# Patient Record
Sex: MALE | Race: WHITE | NOT HISPANIC OR LATINO | Employment: FULL TIME | URBAN - METROPOLITAN AREA
[De-identification: names, ages, dates, MRNs, and addresses within clinical notes are randomized per-mention and may not be internally consistent; named-entity substitution may affect disease eponyms.]

---

## 2023-08-04 ENCOUNTER — OFFICE VISIT (OUTPATIENT)
Dept: INTERNAL MEDICINE | Facility: CLINIC | Age: 53
End: 2023-08-04
Payer: COMMERCIAL

## 2023-08-04 VITALS
TEMPERATURE: 98.9 F | BODY MASS INDEX: 36.31 KG/M2 | HEIGHT: 70 IN | DIASTOLIC BLOOD PRESSURE: 110 MMHG | WEIGHT: 253.6 LBS | OXYGEN SATURATION: 95 % | HEART RATE: 71 BPM | SYSTOLIC BLOOD PRESSURE: 160 MMHG

## 2023-08-04 DIAGNOSIS — I10 ESSENTIAL HYPERTENSION: Primary | ICD-10-CM

## 2023-08-04 PROBLEM — R42 DIZZINESS AND GIDDINESS: Status: ACTIVE | Noted: 2018-01-26

## 2023-08-04 PROBLEM — E66.01 CLASS 3 SEVERE OBESITY WITHOUT SERIOUS COMORBIDITY WITH BODY MASS INDEX (BMI) OF 40.0 TO 44.9 IN ADULT: Status: ACTIVE | Noted: 2021-12-17

## 2023-08-04 PROBLEM — E78.2 MIXED HYPERLIPIDEMIA: Status: ACTIVE | Noted: 2021-12-17

## 2023-08-04 PROBLEM — Z12.11 SCREEN FOR COLON CANCER: Status: ACTIVE | Noted: 2019-01-31

## 2023-08-04 PROBLEM — B35.1 ONYCHOMYCOSIS: Status: ACTIVE | Noted: 2019-02-12

## 2023-08-04 PROBLEM — R68.82 DECREASED SEX DRIVE: Status: ACTIVE | Noted: 2019-01-29

## 2023-08-04 PROBLEM — J01.40 SUBACUTE PANSINUSITIS: Status: ACTIVE | Noted: 2022-11-04

## 2023-08-04 PROBLEM — E66.813 CLASS 3 SEVERE OBESITY WITHOUT SERIOUS COMORBIDITY WITH BODY MASS INDEX (BMI) OF 40.0 TO 44.9 IN ADULT: Status: ACTIVE | Noted: 2021-12-17

## 2023-08-04 PROBLEM — E55.9 VITAMIN D DEFICIENCY: Status: ACTIVE | Noted: 2019-01-29

## 2023-08-04 PROBLEM — N52.9 ED (ERECTILE DYSFUNCTION): Status: ACTIVE | Noted: 2019-02-12

## 2023-08-04 PROBLEM — M99.00 SOMATIC DYSFUNCTION OF HEAD REGION: Status: ACTIVE | Noted: 2018-01-26

## 2023-08-04 RX ORDER — MULTIPLE VITAMINS W/ MINERALS TAB 9MG-400MCG
1 TAB ORAL DAILY
COMMUNITY

## 2023-08-04 RX ORDER — LISINOPRIL 10 MG/1
10 TABLET ORAL DAILY
COMMUNITY
End: 2023-08-04 | Stop reason: SDUPTHER

## 2023-08-04 RX ORDER — LISINOPRIL 10 MG/1
10 TABLET ORAL DAILY
Qty: 30 TABLET | Refills: 6 | Status: SHIPPED | OUTPATIENT
Start: 2023-08-04

## 2023-11-21 ENCOUNTER — OFFICE VISIT (OUTPATIENT)
Dept: INTERNAL MEDICINE | Facility: CLINIC | Age: 53
End: 2023-11-21
Payer: COMMERCIAL

## 2023-11-21 VITALS
TEMPERATURE: 97.8 F | SYSTOLIC BLOOD PRESSURE: 120 MMHG | WEIGHT: 252.2 LBS | DIASTOLIC BLOOD PRESSURE: 90 MMHG | OXYGEN SATURATION: 97 % | BODY MASS INDEX: 36.11 KG/M2 | HEART RATE: 64 BPM | HEIGHT: 70 IN

## 2023-11-21 DIAGNOSIS — Z12.11 SCREEN FOR COLON CANCER: ICD-10-CM

## 2023-11-21 DIAGNOSIS — Z23 NEED FOR VACCINATION: ICD-10-CM

## 2023-11-21 DIAGNOSIS — R20.2 PARESTHESIAS: ICD-10-CM

## 2023-11-21 DIAGNOSIS — F41.9 ANXIETY: ICD-10-CM

## 2023-11-21 DIAGNOSIS — F17.290 CIGAR SMOKER: ICD-10-CM

## 2023-11-21 DIAGNOSIS — Z00.00 ANNUAL PHYSICAL EXAM: Primary | ICD-10-CM

## 2023-11-21 DIAGNOSIS — E78.2 MIXED HYPERLIPIDEMIA: ICD-10-CM

## 2023-11-21 DIAGNOSIS — N52.9 ERECTILE DYSFUNCTION, UNSPECIFIED ERECTILE DYSFUNCTION TYPE: ICD-10-CM

## 2023-11-21 DIAGNOSIS — Z11.59 ENCOUNTER FOR HEPATITIS C SCREENING TEST FOR LOW RISK PATIENT: ICD-10-CM

## 2023-11-21 DIAGNOSIS — I10 ESSENTIAL HYPERTENSION: ICD-10-CM

## 2023-11-21 DIAGNOSIS — Z13.21 ENCOUNTER FOR VITAMIN DEFICIENCY SCREENING: ICD-10-CM

## 2023-11-21 DIAGNOSIS — E66.01 CLASS 3 SEVERE OBESITY DUE TO EXCESS CALORIES WITHOUT SERIOUS COMORBIDITY WITH BODY MASS INDEX (BMI) OF 40.0 TO 44.9 IN ADULT: ICD-10-CM

## 2023-11-21 NOTE — LETTER
[facility]  Vaccine Consent Form    Patient Name:  Jayden Stafford  Patient :  1970     Vaccine(s) Ordered    Pneumococcal Conjugate Vaccine 20-Valent (PCV20)        Screening Checklist  The following questions should be completed prior to vaccination. If you answer “yes” to any question, it does not necessarily mean you should not be vaccinated. It just means we may need to clarify or ask more questions. If a question is unclear, please ask your healthcare provider to explain it.    Yes No   Any fever or moderate to severe illness today (mild illness and/or antibiotic treatment are not contraindications)?     Do you have a history of a serious reaction to any previous vaccinations, such as anaphylaxis, encephalopathy within 7 days, Guillain-Cimarron syndrome within 6 weeks, seizure?     Have you received any live vaccine(s) (e.g MMR, SARI) or any other vaccines in the last month (to ensure duplicate doses aren't given)?     Do you have an anaphylactic allergy to latex (DTaP, DTaP-IPV, Hep A, Hep B, MenB, RV, Td, Tdap), baker’s yeast (Hep B, HPV), polysorbates (RSV, nirsevimab, PCV 20, Rotavirrus, Tdap, Shingrix), or gelatin (SARI, MMR)?     Do you have an anaphylactic allergy to neomycin (Rabies, SARI, MMR, IPV, Hep A), polymyxin B (IPV), or streptomycin (IPV)?      Any cancer, leukemia, AIDS, or other immune system disorder? (SARI, MMR, RV)     Do you have a parent, brother, or sister with an immune system problem (if immune competence of vaccine recipient clinically verified, can proceed)? (MMR, SARI)     Any recent steroid treatments for >2 weeks, chemotherapy, or radiation treatment? (SARI, MMR)     Have you received antibody-containing blood transfusions or IVIG in the past 11 months (recommended interval is dependent on product)? (MMR, SARI)     Have you taken antiviral drugs (acyclovir, famciclovir, valacyclovir for SARI) in the last 24 or 48 hours, respectively?      Are you pregnant or planning to become  pregnant within 1 month? (SARI, MMR, HPV, IPV, MenB, Abrexvy; For Hep B- refer to Engerix-B; For RSV - Abrysvo is indicated for 32-36 weeks of pregnancy from September to January)     For infants, have you ever been told your child has had intussusception or a medical emergency involving obstruction of the intestine (Rotavirus)? If not for an infant, can skip this question.         *Ordering Physician/APC should be consulted if “yes” is checked by the patient or guardian above.      I have received, read, and understand the Vaccine Information Statement (VIS) for each vaccine ordered above.  I have considered my health status as well as the health status of my close contacts.  I have taken the opportunity to discuss my vaccine questions with my health care provider.   I have requested that the ordered vaccine(s) be given to me.  I understand the benefits and risks of the vaccines.  I understand that I should remain in the clinic for 15 minutes after receiving the vaccine(s).  _________________________________________________________  Signature of Patient or Parent/Legal Guardian ____________________  Date

## 2023-11-21 NOTE — PROGRESS NOTES
"Chief Complaint  Annual Exam (Hand numbness)    Subjective        Jayden Stafford presents to [facility]  History of Present Illness  Here for annual exam.    Reports hand numbness:   reports some intermittent hand numbness, only occurs when sleeps on bilateral shoulders. Will go away when moves shoulders around. Not losing  strength.     Hypertension: He was last seen in our office on August 4, 2023 reported taking lisinopril 10 mg p.o. daily least 2 years with about a 3-month hiatus.  At the time, he stated that he was not measuring his blood pressure at home.  His blood pressure was elevated, with a systolic blood pressure in 150s.  Not reporting any chest pain or shortness of air. Still taking lisinopril 10 mg daily and doing.     At last office visit, he did state that he tries to do some form of physical activity per week, including yard work and golf and walking.  At the time, he also states that he was trying to watch his sodium intake and make dietary changes, including incorporating more fresh vegetables to his diet.  He does smoke cigars he plays golf.  He also stated that he drank about 10 alcoholic drinks per week, again associated with playing golf and having dinner.    Erectile dysfunction: he went on vacation and did not have ED. No changes in his urination, but states his wife is concerned.     Anxiety: he has periods of anxiety, life stressors. No history of sucidie or suicidal ideation. Has talked to a behavior health provider in the past, did not have a good rapport. He took Paxil about 20 years ago and did not like it, made him feel 'fuzzy\". Took 10 mg .     Family history of colon cancer (father who passed away from colon cancer).  His mother passed away from natural causes.    Hyperlipidemia: Labs from December 2021 indicated that he did have elevated lipids and he has never been started on statin therapy. Last colonoscopy was 2019 with Dr.Rajesh Bell. " "Recommended next in 3 years, so is over due. No issues with stooling.       Objective   Vital Signs:  /90 (BP Location: Left arm, Patient Position: Sitting, Cuff Size: Large Adult)   Pulse 64   Temp 97.8 °F (36.6 °C) (Infrared)   Ht 177.8 cm (70\")   Wt 114 kg (252 lb 3.2 oz)   SpO2 97%   BMI 36.19 kg/m²   Estimated body mass index is 36.19 kg/m² as calculated from the following:    Height as of this encounter: 177.8 cm (70\").    Weight as of this encounter: 114 kg (252 lb 3.2 oz).               Physical Exam  Vitals reviewed.   Constitutional:       General: He is not in acute distress.     Appearance: Normal appearance. He is obese. He is not ill-appearing, toxic-appearing or diaphoretic.   HENT:      Head: Normocephalic.      Right Ear: Tympanic membrane, ear canal and external ear normal.      Left Ear: Tympanic membrane, ear canal and external ear normal.   Eyes:      Conjunctiva/sclera: Conjunctivae normal.      Pupils: Pupils are equal, round, and reactive to light.   Cardiovascular:      Rate and Rhythm: Normal rate and regular rhythm.      Pulses: Normal pulses.      Heart sounds: Normal heart sounds.   Pulmonary:      Effort: Pulmonary effort is normal.      Breath sounds: Decreased breath sounds (Mild and throughout all lobes) present. No wheezing, rhonchi or rales.   Abdominal:      General: Bowel sounds are normal.      Palpations: Abdomen is soft.      Tenderness: There is no abdominal tenderness.   Musculoskeletal:      Right lower leg: No edema.      Left lower leg: No edema.   Skin:     General: Skin is warm.      Capillary Refill: Capillary refill takes less than 2 seconds.   Neurological:      General: No focal deficit present.      Mental Status: He is alert and oriented to person, place, and time. Mental status is at baseline.      Motor: No weakness.      Coordination: Coordination normal.      Gait: Gait normal.      Deep Tendon Reflexes: Reflexes normal.   Psychiatric:         Mood " and Affect: Mood normal.         Behavior: Behavior normal.         Thought Content: Thought content normal.         Judgment: Judgment normal.        Result Review :                         Assessment and Plan   Patient is a pleasant 53-year-old male with obesity, essential hypertension, mixed hyperlipidemia, BPPV, intermittent erectile dysfunction he states mostly related to anxiety, cigar smoker here for annual exam.     We discussed preventative care including age and patient-appropriate immunizations and cancer screening. We also discussed the importance of exercise and a healthy diet. This is their annual preventative exam.             Diagnoses and all orders for this visit:    1. Annual physical exam (Primary)  -     CBC & Differential  -     Comprehensive Metabolic Panel    2. Essential hypertension  Comments:  Blood pressure well controlled today.  Continue current 10 mg dose lisinopril.  Enourage to monitor at home, record readings.    3. Mixed hyperlipidemia  -     Lipid Panel    4. Class 3 severe obesity due to excess calories without serious comorbidity with body mass index (BMI) of 40.0 to 44.9 in adult  Comments:  Discussed lifestyle modifications, including cutting back on alcohol consumption.  Orders:  -     Thyroid Panel With TSH    5. Screen for colon cancer  Comments:  Last colonoscopy 2019.  Family history of colon cancer (father).  Orders:  -     Ambulatory Referral For Screening Colonoscopy    6. Need for vaccination  -     Pneumococcal Conjugate Vaccine 20-Valent (PCV20)    7. Encounter for hepatitis C screening test for low risk patient  -     HCV Antibody Rfx To Qnt PCR    8. Paresthesias  Comments:  Bilateral hands in the mornings after lying on shoulders.  Suspect due to shoulder pain.  Further treatment\evaluation pending labs.  Orders:  -     Cancel: Vitamin D,25-Hydroxy  -     Magnesium  -     Vitamin B12    9. Encounter for vitamin deficiency screening  -     Vitamin  D,25-Hydroxy    10. Erectile dysfunction, unspecified erectile dysfunction type  -     Ambulatory Referral to Urology    11. Anxiety  -     Ambulatory Referral to Behavioral Health    12. Cigar smoker  Comments:  Encourage patient to completely abstain from any type of tobacco use.             Follow Up   Return in about 1 year (around 11/21/2024) for Annual physical.  Patient was given instructions and counseling regarding his condition or for health maintenance advice. Please see specific information pulled into the AVS if appropriate.

## 2023-11-22 LAB
25(OH)D3+25(OH)D2 SERPL-MCNC: 24.6 NG/ML (ref 30–100)
ALBUMIN SERPL-MCNC: 4.2 G/DL (ref 3.8–4.9)
ALBUMIN/GLOB SERPL: 1.7 {RATIO} (ref 1.2–2.2)
ALP SERPL-CCNC: 75 IU/L (ref 44–121)
ALT SERPL-CCNC: 33 IU/L (ref 0–44)
AST SERPL-CCNC: 26 IU/L (ref 0–40)
BASOPHILS # BLD AUTO: 0.1 X10E3/UL (ref 0–0.2)
BASOPHILS NFR BLD AUTO: 1 %
BILIRUB SERPL-MCNC: 0.6 MG/DL (ref 0–1.2)
BUN SERPL-MCNC: 10 MG/DL (ref 6–24)
BUN/CREAT SERPL: 9 (ref 9–20)
CALCIUM SERPL-MCNC: 9.2 MG/DL (ref 8.7–10.2)
CHLORIDE SERPL-SCNC: 105 MMOL/L (ref 96–106)
CHOLEST SERPL-MCNC: 196 MG/DL (ref 100–199)
CO2 SERPL-SCNC: 23 MMOL/L (ref 20–29)
CREAT SERPL-MCNC: 1.16 MG/DL (ref 0.76–1.27)
EGFRCR SERPLBLD CKD-EPI 2021: 74 ML/MIN/1.73
EOSINOPHIL # BLD AUTO: 0.1 X10E3/UL (ref 0–0.4)
EOSINOPHIL NFR BLD AUTO: 2 %
ERYTHROCYTE [DISTWIDTH] IN BLOOD BY AUTOMATED COUNT: 13.9 % (ref 11.6–15.4)
FT4I SERPL CALC-MCNC: 1.3 (ref 1.2–4.9)
GLOBULIN SER CALC-MCNC: 2.5 G/DL (ref 1.5–4.5)
GLUCOSE SERPL-MCNC: 89 MG/DL (ref 70–99)
HCT VFR BLD AUTO: 44.9 % (ref 37.5–51)
HCV AB SERPL QL IA: NORMAL
HCV IGG SERPL QL IA: NON REACTIVE
HDLC SERPL-MCNC: 30 MG/DL
HGB BLD-MCNC: 15.3 G/DL (ref 13–17.7)
IMM GRANULOCYTES # BLD AUTO: 0 X10E3/UL (ref 0–0.1)
IMM GRANULOCYTES NFR BLD AUTO: 0 %
LDLC SERPL CALC-MCNC: 127 MG/DL (ref 0–99)
LYMPHOCYTES # BLD AUTO: 1.5 X10E3/UL (ref 0.7–3.1)
LYMPHOCYTES NFR BLD AUTO: 18 %
MAGNESIUM SERPL-MCNC: 2.1 MG/DL (ref 1.6–2.3)
MCH RBC QN AUTO: 30.9 PG (ref 26.6–33)
MCHC RBC AUTO-ENTMCNC: 34.1 G/DL (ref 31.5–35.7)
MCV RBC AUTO: 91 FL (ref 79–97)
MONOCYTES # BLD AUTO: 0.7 X10E3/UL (ref 0.1–0.9)
MONOCYTES NFR BLD AUTO: 8 %
NEUTROPHILS # BLD AUTO: 5.9 X10E3/UL (ref 1.4–7)
NEUTROPHILS NFR BLD AUTO: 71 %
PLATELET # BLD AUTO: 284 X10E3/UL (ref 150–450)
POTASSIUM SERPL-SCNC: 4.5 MMOL/L (ref 3.5–5.2)
PROT SERPL-MCNC: 6.7 G/DL (ref 6–8.5)
RBC # BLD AUTO: 4.95 X10E6/UL (ref 4.14–5.8)
SODIUM SERPL-SCNC: 141 MMOL/L (ref 134–144)
T3RU NFR SERPL: 24 % (ref 24–39)
T4 SERPL-MCNC: 5.6 UG/DL (ref 4.5–12)
TRIGL SERPL-MCNC: 215 MG/DL (ref 0–149)
TSH SERPL DL<=0.005 MIU/L-ACNC: 1.2 UIU/ML (ref 0.45–4.5)
VIT B12 SERPL-MCNC: 467 PG/ML (ref 232–1245)
VLDLC SERPL CALC-MCNC: 39 MG/DL (ref 5–40)
WBC # BLD AUTO: 8.3 X10E3/UL (ref 3.4–10.8)

## 2023-12-14 ENCOUNTER — TELEMEDICINE (OUTPATIENT)
Dept: PSYCHIATRY | Facility: CLINIC | Age: 53
End: 2023-12-14
Payer: COMMERCIAL

## 2023-12-14 DIAGNOSIS — F41.1 GAD (GENERALIZED ANXIETY DISORDER): Primary | ICD-10-CM

## 2023-12-14 DIAGNOSIS — F33.0 MDD (MAJOR DEPRESSIVE DISORDER), RECURRENT EPISODE, MILD: ICD-10-CM
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
## 2024-01-05 ENCOUNTER — TELEMEDICINE (OUTPATIENT)
Dept: PSYCHIATRY | Facility: CLINIC | Age: 54
End: 2024-01-05
Payer: COMMERCIAL

## 2024-01-05 DIAGNOSIS — F33.0 MDD (MAJOR DEPRESSIVE DISORDER), RECURRENT EPISODE, MILD: ICD-10-CM

## 2024-01-05 DIAGNOSIS — F41.1 GAD (GENERALIZED ANXIETY DISORDER): Primary | ICD-10-CM

## 2024-01-05 NOTE — PROGRESS NOTES
Date: January 8, 2024  Time In: 9:58  Time out: 10:52      This provider is located at the [facility] (through [facility]), [address], [place] [address] using a secure B[employer]Mod[employer] Video Visit through [employer]. Patient is being seen remotely via telehealth at home address in [place] and stated they are in a secure environment for this session. The patient's condition being diagnosed/treated is appropriate for telemedicine. The provider identified herself as well as her credentials. The patient, and/or patients guardian, consent to be seen remotely, and when consent is given they understand that the consent allows for patient identifiable information to be sent to a third party as needed. They may refuse to be seen remotely at any time. The electronic data is encrypted and password protected, and the patient and/or guardian has been advised of the potential risks to privacy not withstanding such measures.     You have chosen to receive care through a telehealth visit.  Do you consent to use a video/audio connection for your medical care today? Yes    Subjective   Jayden Stafford is a 53 y.o. male who presents today for follow up    Chief Complaint:   Chief Complaint   Patient presents with    Anxiety    Depression    Sleeping Problem     Pt was enc to contact PCP and discuss sleep study options.            History of Present Illness: Rapport was established through conversation and unconditional positive regard. Recent events were discussed and how these events impact Pt's emotional health.   Pt states they have been using coping skills successfully 3 out of 5 times since last report.  Pt reports continuation of symptoms and states the intensity and duration of symptoms has remained unchanged over the past 2 weeks. Pt rates anxiety at 7/10 and depression at 2.     Discussed Pt's sleep habits and encouraged healthy habits. Pt encouraged to discuss sleep study at next  "appointment with medical provider.  Pt reports he has agitation and anxiety about things that has not occurred yet. Pt states he gets very agitated with his family at times for not performing tasks while Pt is away on business.  Pt states he holds comments and requests in until he gets home because he does not like to communicate using technology and he prefers face to face interactions. Pt and Counselor discussed the importance of processing feelings and not holding them in, as this increases Pt's anxiety and causes Pt to ruminate about the situation. Pt gave an example of asking his family to make sure the cars were parked so the trash truck could maneuver to do the . Pt was encouraged to identify maladaptive thoughts and statements and re-frame from a different viewpoint, and this was helpful to Pt.      Clinical Maneuvering/Intervention:  CBT was utilized to encourage Pt to identify maladaptive thoughts and behaviors and replace with more affirming and positive.CBT used to assist Pt with managing anxiety and mood issues through controlled breathing, muscle relaxation, and mindfulness.CBT utilized to encourage Pt to refrain from catastrophizing and adopting all or nothing thinking.  Pt encouraged to re-frame thoughts and self talk and identify 'I can't' statements and replace with 'I will\" statements.        Assisted patient in processing above session content; acknowledged and normalized patient’s thoughts, feelings, and concerns.  Rationalized patient thought process regarding concerns presented at session.  Discussed triggers associated with patient's  anxiety  and depression  Also discussed coping skills for patient to implement such as positive self talk and re-framing.    Allowed patient to freely discuss issues without interruption or judgment. Provided safe, confidential environment to facilitate the development of positive therapeutic relationship and encourage open, honest communication. Assisted " patient in identifying risk factors which would indicate the need for higher level of care including thoughts to harm self or others and/or self-harming behavior and encouraged patient to contact this office, call 911, or present to the nearest emergency room should any of these events occur. Discussed crisis intervention services and means to access. Patient adamantly and convincingly denies current suicidal or homicidal ideation or perceptual disturbance.    Assessment:     Patient appears to maintain relative stability as compared to their baseline.  However, patient continues to struggle with anxiety and depression, which continues to cause impairment in important areas of functioning.  A result, they can be reasonably expected to continue to benefit from treatment and would likely be at increased risk for decompensation otherwise.      PHQ-Score Total:  PHQ-9 Total Score:      PAMELLA-7 Score Total:         Mental Status Exam:   Hygiene:   good  Cooperation:  Cooperative  Eye Contact:  Good  Psychomotor Behavior:  Appropriate  Affect:  Restricted  Mood: anxious and irritable  Speech:  Normal  Thought Process:  Goal directed  Thought Content:  Normal  Suicidal:  None  Homicidal:  None  Hallucinations:  None  Delusion:  None  Memory:  Intact  Orientation:  Grossly intact  Reliability:  good  Insight:  Fair  Judgement:  Good  Impulse Control:  Good  Physical/Medical Issues:  No        Patient's Support Network Includes:  wife    Functional Status: Moderate impairment     Progress toward goal: Not at goal    Prognosis: Good with Ongoing Treatment         Plan:    Patient will continue in individual outpatient therapy with focus on improved functioning and coping skills, maintaining stability, and avoiding decompensation and the need for higher level of care.    Patient will adhere to medication regimen as prescribed and report any side effects. Patient will contact this office, call 911 or present to the nearest  emergency room should suicidal or homicidal ideations occur. Provide Cognitive Behavioral Therapy and Solution Focused Therapy to improve functioning, maintain stability, and avoid decompensation and the need for higher level of care.     Return in about 2 weeks (around 1/19/2024).      VISIT DIAGNOSIS:    Diagnosis Plan   1. PAMELLA (generalized anxiety disorder)        2. MDD (major depressive disorder), recurrent episode, mild         10:11 EST       This document has been electronically signed by REZA Martinez  January 8, 2024      Part of this note may be an electronic transcription/translation of spoken language to printed text using the Dragon Dictation System.

## 2024-01-09 DIAGNOSIS — I10 ESSENTIAL HYPERTENSION: ICD-10-CM

## 2024-01-10 DIAGNOSIS — G47.9 SLEEP DISTURBANCES: Primary | ICD-10-CM

## 2024-01-10 RX ORDER — LISINOPRIL 10 MG/1
10 TABLET ORAL DAILY
Qty: 30 TABLET | Refills: 6 | Status: SHIPPED | OUTPATIENT
Start: 2024-01-10

## 2024-02-02 ENCOUNTER — TELEMEDICINE (OUTPATIENT)
Dept: PSYCHIATRY | Facility: CLINIC | Age: 54
End: 2024-02-02
Payer: COMMERCIAL

## 2024-02-02 DIAGNOSIS — F41.1 GAD (GENERALIZED ANXIETY DISORDER): Primary | ICD-10-CM

## 2024-02-02 DIAGNOSIS — F33.0 MDD (MAJOR DEPRESSIVE DISORDER), RECURRENT EPISODE, MILD: ICD-10-CM

## 2024-02-02 PROCEDURE — 90837 PSYTX W PT 60 MINUTES: CPT | Performed by: COUNSELOR

## 2024-02-02 NOTE — PROGRESS NOTES
Date: February 5, 2024  Time In: 8:30   Time out: 9:39      This provider is located at the [facility] ([facility]), [address], [place] [address] using a secure Compass[employer]art Video Visit through [employer]. Patient is being seen remotely via telehealth at home address in [place] and stated they are in a secure environment for this session. The patient's condition being diagnosed/treated is appropriate for telemedicine. The provider identified herself as well as her credentials. The patient, and/or patients guardian, consent to be seen remotely, and when consent is given they understand that the consent allows for patient identifiable information to be sent to a third party as needed. They may refuse to be seen remotely at any time. The electronic data is encrypted and password protected, and the patient and/or guardian has been advised of the potential risks to privacy not withstanding such measures.     You have chosen to receive care through a telehealth visit.  Do you consent to use a video/audio connection for your medical care today? Yes    Subjective   Jayden Stafford is a 53 y.o. male who presents today for follow up    Chief Complaint:   Chief Complaint   Patient presents with    Anxiety    Depression    Sleeping Problem        History of Present Illness: Rapport was established through conversation and unconditional positive regard. Recent events were discussed and how these events impact Pt's emotional health.   Pt states they have been using coping skills successfully 3 out of 5 times since last report.  Pt reports continuation of symptoms and states the intensity and duration of symptoms has remained unchanged over the past 2 weeks. Pt rates anxiety at 2/10 and depression at 2. Pt reports he continues to have significant sleep issues and he has an appointment with a sleep neurologist.  Pt states he and s/o went on a cruise last week and he had an enjoyable.  Pt states he was very apprehensive about going on the trip and he has heightened anxiety over many aspects of the environment of the trip being out of his control.  Pt states his anxiety was 8/10 just before leaving home and he states anxiety averaged 2/0 and it was very pleasant. Discussed pre-emptive anxiety and how negative thought loops can affect Pt and diminish enjoyment of pleasurable activities. Discussed being mindful and present in the moment, affirmations, and visualizing the anxiety as a cycle and interrupting the momentum using self talk and actions. Healthy sleep habits discussed.            Anxiety 0-10 rate: 1    Depression: 0    Clinical Maneuvering/Intervention:Pt was praised for their report of successful use of learned coping skills and was encouraged to continue practicing calming and mood elevating techniques daily.CBT was utilized to encourage Pt to identify maladaptive thoughts and behaviors and replace with more affirming and positive. CBT used to assist Pt with managing anxiety and mood issues through controlled breathing, muscle relaxation, and mindfulness.Healthy sleep habits were discussed such as maintaining a schedule, routine, avoiding caffeine, and limiting screen time before bed.        Assisted patient in processing above session content; acknowledged and normalized patient’s thoughts, feelings, and concerns.  Rationalized patient thought process regarding concerns presented at session.  Discussed triggers associated with patient's  anxiety  and depression  Also discussed coping skills for patient to implement such as grounding , mindfulness , and positive self talk     Allowed patient to freely discuss issues without interruption or judgment. Provided safe, confidential environment to facilitate the development of positive therapeutic relationship and encourage open, honest communication. Assisted patient in identifying risk factors which would indicate the need for higher level  of care including thoughts to harm self or others and/or self-harming behavior and encouraged patient to contact this office, call 911, or present to the nearest emergency room should any of these events occur. Discussed crisis intervention services and means to access. Patient adamantly and convincingly denies current suicidal or homicidal ideation or perceptual disturbance.    Assessment:     Patient appears to maintain relative stability as compared to their baseline.  However, patient continues to struggle with anxiety and depression which continues to cause impairment in important areas of functioning.  A result, they can be reasonably expected to continue to benefit from treatment and would likely be at increased risk for decompensation otherwise.      PHQ-Score Total:  PHQ-9 Total Score:      PAMELLA-7 Score Total:         Mental Status Exam:   Hygiene:   good  Cooperation:  Cooperative  Eye Contact:  Good  Psychomotor Behavior:  Appropriate  Affect:  Full range  Mood: anxious  Speech:  Normal  Thought Process:  Goal directed  Thought Content:  Normal  Suicidal:  None  Homicidal:  None  Hallucinations:  None  Delusion:  None  Memory:  Intact  Orientation:  Grossly intact  Reliability:  good  Insight:  Good  Judgement:  Good  Impulse Control:  Good  Physical/Medical Issues:  No        Patient's Support Network Includes:  wife    Functional Status: Mild impairment     Progress toward goal: Not at goal    Prognosis: Good with Ongoing Treatment         Plan:    Patient will continue in individual outpatient therapy with focus on improved functioning and coping skills, maintaining stability, and avoiding decompensation and the need for higher level of care.    Patient will adhere to medication regimen as prescribed and report any side effects. Patient will contact this office, call 911 or present to the nearest emergency room should suicidal or homicidal ideations occur. Provide Cognitive Behavioral Therapy and  Solution Focused Therapy to improve functioning, maintain stability, and avoid decompensation and the need for higher level of care.     Return in about 1 week (around 2/9/2024).      VISIT DIAGNOSIS:    Diagnosis Plan   1. PAMELLA (generalized anxiety disorder)        2. MDD (major depressive disorder), recurrent episode, mild         08:35 EST       This document has been electronically signed by REZA Martinez  February 5, 2024      Part of this note may be an electronic transcription/translation of spoken language to printed text using the Dragon Dictation System.

## 2024-02-05 NOTE — PATIENT INSTRUCTIONS
Here is a link to the Patient Health Questionnaire - 9 (PHQ-9).    https://www.apa.org/depression-guideline/patient-health-questionnaire.pdf            Here is the link for the PAMELLA-7 scale    https://adaa.org/sites/default/files/[email or web address]-[email or web address]_[email or web address].pdf

## 2024-02-16 ENCOUNTER — OFFICE VISIT (OUTPATIENT)
Dept: SLEEP MEDICINE | Facility: HOSPITAL | Age: 54
End: 2024-02-16
Payer: COMMERCIAL

## 2024-02-16 VITALS — BODY MASS INDEX: 36.94 KG/M2 | HEART RATE: 75 BPM | WEIGHT: 258 LBS | HEIGHT: 70 IN | OXYGEN SATURATION: 97 %

## 2024-02-16 DIAGNOSIS — E66.9 CLASS 2 OBESITY WITH BODY MASS INDEX (BMI) OF 37.0 TO 37.9 IN ADULT, UNSPECIFIED OBESITY TYPE, UNSPECIFIED WHETHER SERIOUS COMORBIDITY PRESENT: ICD-10-CM

## 2024-02-16 DIAGNOSIS — R29.818 SUSPECTED SLEEP APNEA: Primary | ICD-10-CM

## 2024-02-16 DIAGNOSIS — R06.83 SNORING: ICD-10-CM

## 2024-02-16 PROCEDURE — G0463 HOSPITAL OUTPT CLINIC VISIT: HCPCS

## 2024-03-01 ENCOUNTER — TELEMEDICINE (OUTPATIENT)
Dept: PSYCHIATRY | Facility: CLINIC | Age: 54
End: 2024-03-01
Payer: COMMERCIAL

## 2024-03-01 DIAGNOSIS — F41.1 GAD (GENERALIZED ANXIETY DISORDER): Primary | ICD-10-CM

## 2024-03-01 NOTE — PROGRESS NOTES
March 3, 2024  Time In: 9:07  Time out: 9:41    NOTE:  Pt had to end the call early to attend a work meeting.    This provider is located at the [facility] ([facility]), [address], [place] [address] using a secure [employer] Video Visit through [email or web address]. Patient is being seen remotely via telehealth at home address in [place] and stated they are in a secure environment for this session. The patient's condition being diagnosed/treated is appropriate for telemedicine. The provider identified herself as well as her credentials. The patient, and/or patients guardian, consent to be seen remotely, and when consent is given they understand that the consent allows for patient identifiable information to be sent to a third party as needed. They may refuse to be seen remotely at any time. The electronic data is encrypted and password protected, and the patient and/or guardian has been advised of the potential risks to privacy not withstanding such measures.     You have chosen to receive care through a telehealth visit.  Do you consent to use a video/audio connection for your medical care today? Yes    Subjective   Jayden Stafford is a 53 y.o. male who presents today for follow up    Chief Complaint:   Chief Complaint   Patient presents with    Anxiety    Depression        History of Present Illness: Rapport was established through conversation and unconditional positive regard. Recent events were discussed and how these events impact Pt's emotional health.   Pt reports successful use coping skills successfully 7 out of 10 times since last report.  Pt reports continuation of symptoms and states the intensity and duration of symptoms has improved since last report..     Pt states he had a sleep study and was advised to use a sleep machine for sleep apnea. Pt states he holding off on picking up his sleep machine due to the expense. Pt reports he has accepted a position with  "another company, and Pt feels this will alleviate much of his anxiety, as work issues contributed to Pt's anxiety over the past 6-12 months.  Pt states he no longer will have a company car and he has been feeling mild to moderate situational stress about having to get a reliable vehicle.  Pt was allowed to verbally process some feelings of anticipatory anxiety over his new job and possibly being \"micro-managed\" by his new employer. Pt was able to gain some insight in the processing.  Pt states he feels the new position will afford him better communication and more acknowledgement and he feels this will also help with his stress at home.      Clinical Maneuvering/Intervention:  CBT was utilized to encourage Pt to identify maladaptive thoughts and behaviors and replace with more affirming and positive.CBT used to assist Pt with managing anxiety and mood issues through controlled breathing, muscle relaxation, and mindfulness.Esteem building was enhanced through praise, reassurance, normalizing/challenging, and encouragement as appropriate. Coping skills were enhanced to build distress tolerance skills and emotional regulation.         Assisted patient in processing above session content; acknowledged and normalized patient’s thoughts, feelings, and concerns.  Rationalized patient thought process regarding concerns presented at session.  Discussed triggers associated with patient's  anxiety  Also discussed coping skills for patient to implement such as self care  and positive self talk     Allowed patient to freely discuss issues without interruption or judgment. Provided safe, confidential environment to facilitate the development of positive therapeutic relationship and encourage open, honest communication. Assisted patient in identifying risk factors which would indicate the need for higher level of care including thoughts to harm self or others and/or self-harming behavior and encouraged patient to contact this " office, call 911, or present to the nearest emergency room should any of these events occur. Discussed crisis intervention services and means to access. Patient adamantly and convincingly denies current suicidal or homicidal ideation or perceptual disturbance.    Assessment:     Patient appears to maintain relative stability as compared to their baseline.  However, patient continues to struggle with anxiety which continues to cause impairment in important areas of functioning.  A result, they can be reasonably expected to continue to benefit from treatment and would likely be at increased risk for decompensation otherwise.      PHQ-Score Total:  PHQ-9 Total Score:      PAMELLA-7 Score Total:         Mental Status Exam:   Hygiene:   good  Cooperation:  Cooperative  Eye Contact:  Good  Psychomotor Behavior:  Appropriate  Affect:  Full range  Mood: normal  Speech:  Normal  Thought Process:  Goal directed  Thought Content:  Normal  Suicidal:  None  Homicidal:  None  Hallucinations:  None  Delusion:  None  Memory:  Intact  Orientation:  Grossly intact  Reliability:  good  Insight:  Good  Judgement:  Good  Impulse Control:  Good  Physical/Medical Issues:  No        Patient's Support Network Includes:  significant other    Functional Status: Mild impairment     Progress toward goal: Not at goal    Prognosis: Good with Ongoing Treatment         Plan:    Patient will continue in individual outpatient therapy with focus on improved functioning and coping skills, maintaining stability, and avoiding decompensation and the need for higher level of care.    Patient will adhere to medication regimen as prescribed and report any side effects. Patient will contact this office, call 911 or present to the nearest emergency room should suicidal or homicidal ideations occur. Provide Cognitive Behavioral Therapy and Solution Focused Therapy to improve functioning, maintain stability, and avoid decompensation and the need for higher level of  care.     Return in about 2 weeks (around 3/15/2024).      VISIT DIAGNOSIS:    Diagnosis Plan   1. PAMELLA (generalized anxiety disorder)         09:07 EST       This document has been electronically signed by REZA Martinez  March 3, 2024      Part of this note may be an electronic transcription/translation of spoken language to printed text using the Dragon Dictation System.

## 2024-03-15 ENCOUNTER — TELEMEDICINE (OUTPATIENT)
Dept: PSYCHIATRY | Facility: CLINIC | Age: 54
End: 2024-03-15
Payer: COMMERCIAL

## 2024-03-15 DIAGNOSIS — F41.1 GAD (GENERALIZED ANXIETY DISORDER): Primary | ICD-10-CM

## 2024-03-15 DIAGNOSIS — F33.0 MDD (MAJOR DEPRESSIVE DISORDER), RECURRENT EPISODE, MILD: ICD-10-CM

## 2024-03-15 NOTE — PROGRESS NOTES
"Date: March 18, 2024  Time In: 8:05  Time out: 9:04      This provider is located at the [facility] (through [facility]), [address], [place] [address] using a secure [employer] Video Visit through [employer]. Patient is being seen remotely via telehealth at home address in [place] and stated they are in a secure environment for this session. The patient's condition being diagnosed/treated is appropriate for telemedicine. The provider identified herself as well as her credentials. The patient, and/or patients guardian, consent to be seen remotely, and when consent is given they understand that the consent allows for patient identifiable information to be sent to a third party as needed. They may refuse to be seen remotely at any time. The electronic data is encrypted and password protected, and the patient and/or guardian has been advised of the potential risks to privacy not withstanding such measures.     You have chosen to receive care through a telehealth visit.  Do you consent to use a video/audio connection for your medical care today? Yes    Subjective   Jayden Stafford is a 53 y.o. male who presents today for follow up appointment.  Chief Complaint:   Chief Complaint   Patient presents with    Anxiety        History of Present Illness: Rapport was established through conversation and unconditional positive regard. Recent events were discussed and how these events impact Pt's emotional health.   Pt reports successful use of learned coping skills 7 out of 10 times since last report.  Pt reports continuation of symptoms and states the intensity and duration of symptoms has worsened \"some\" since last report. Pt rates anxiety at 8/10 and depression at 4/10.     Sleep: Pt reports sleep has worsened since last report. Healthy sleep habits were discussed such as maintaining a schedule, routine, avoiding caffeine, and limiting screen time before bed. Pt attributes his sleep " "issues to diagnosed sleep apnea, which is being treated by his PCP.    Appetite:  Pt reports appetite has been good since last report.  Discussed the importance of hydration and eating a healthy diet for overall and mental health.    Daily Functioning:  Since last report, Pt states symptoms are causing Mild difficulty in daily functioning.      Subjective Report:  Pt reports \"things are kind of crazy\" due to Pt being let go of his employment suddenly. Pt states this is upset but not too impactful as Pt has recently taken another position with another company. Pt reports he feels somewhat let down but also this justified his move in employment. Pt states he has been able to make more rational and thoughtful decisions over the past month. Pt reports he continues to struggle with \"letting some things go.\"  Pt reports he has established a routine with the family puppy, and this has helped his anxiety as well. Pt does have some antipatory fear of now the new job will be organized, but Pt states he feels it is WNL of someone starting a new job.  Pt praised for his efforts to minimize emotional responses to stressors.      .  Reviewed coping skills and encouraged Pt to continue to practicing daily when not under distress. Pt was praised for their attempts to decrease symptoms and mitigate activating events.    Medication compliance: Pt reports medications are being taken daily as prescribed. Discussed the importance of compliance with prescriber's directions and Pt was instructed to report questions/concerns, as well as missed doses or discontinuation of medication by Pt.     Clinical Maneuvering/Intervention:  CBT was utilized to encourage Pt to identify maladaptive thoughts and behaviors and replace with more affirming and positive.Pt encouraged to set and maintain appropriate and healthy boundaries with others. Pt was instructed to practice daily using appropriate and specific words to communicate feelings to others  " Encouraged Pt to gain a feeling of empowerment over their situation by being cognizant of the things they are able to manipulate and control, such as choosing an outfit for the day or what they will have for lunch. Motivational interviewing used to encourage Pt to identify strengths which can be utilized in working toward treatment goals. Pt encouraged to practice daily learned skills such as controlled breathing, grounding, and mindfulness. Pt was encouraged to ask for help from support persons to assist them in maintaining stability and alleviate symptoms. Discussed the importance of being one's own mental health advocate and to refrain from seeing the need to ask for help as a weakness. Pt was encouraged to formulate a plan of action to be more proactive in managing stressors and refrain from using reactive and automatic heightened emotional responses.     Solution-focused therapy employed to identify how Pt would like their life to be if they were to make positive changes. Pt encouraged to identify effective coping skills and strengths they can use to continue utilizing those skills. Pt encouraged to discontinue utilizing non-effective coping mechanisms. Pt provided with feedback to highlight achievements and personal and other resources. Encouraged use of SMART goals    Assisted patient in processing above session content; acknowledged and normalized patient’s thoughts, feelings, and concerns.  Rationalized patient thought process regarding concerns presented at session.  Discussed triggers associated with patient's  anxiety  and depression  Also discussed coping skills for patient to implement such as mindfulness , self care , positive self talk , and prioritizing self care.    Allowed patient to freely discuss issues without interruption or judgment. Provided safe, confidential environment to facilitate the development of positive therapeutic relationship and encourage open, honest communication. Assisted  patient in identifying risk factors which would indicate the need for higher level of care including thoughts to harm self or others and/or self-harming behavior and encouraged patient to contact this office, call 911, or present to the nearest emergency room should any of these events occur. Discussed crisis intervention services and means to access. Patient adamantly and convincingly denies current suicidal or homicidal ideation or perceptual disturbance.    Assessment:     Patient appears to maintain relative stability as compared to their baseline.  However, patient persistently struggles with symptoms which continue to cause impairment in important areas of functioning.  As a result, they can be reasonably expected to continue to benefit from treatment and would likely be at increased risk for decompensation otherwise.      PHQ-Score Total:  PHQ-9 Total Score: 9    PAMELLA-7 Score Total:         Mental Status Exam:   Hygiene:   good  Cooperation:  Cooperative  Eye Contact:  Good  Psychomotor Behavior:  Appropriate  Affect:  Full range  Mood: anxious  Speech:  Normal  Thought Process:  Goal directed  Thought Content:  Normal  Suicidal:  None  Homicidal:  None  Hallucinations:  None  Delusion:  None  Memory:  Intact  Orientation:  Grossly intact  Reliability:  good  Insight:  Good  Judgement:  Good  Impulse Control:  Good  Physical/Medical Issues:  No        Functional Status: Mild impairment     Progress toward goal: Not at goal    Prognosis: Good with continued therapeutic intervention        Plan:    Patient will continue in individual outpatient therapy with focus on improved functioning and coping skills, maintaining stability, and avoiding decompensation and the need for higher level of care.    Patient will adhere to medication regimen as prescribed and report any side effects. Patient will contact this office, call 911 or present to the nearest emergency room should suicidal or homicidal ideations occur.  Provide Cognitive Behavioral Therapy and Solution Focused Therapy to improve functioning, maintain stability, and avoid decompensation and the need for higher level of care.     Return in about 2 weeks (around 3/29/2024).      VISIT DIAGNOSIS:    Diagnosis Plan   1. PAMELLA (generalized anxiety disorder)        2. MDD (major depressive disorder), recurrent episode, mild         08:05 EDT       This document has been electronically signed by REZA Martinez  March 18, 2024      Part of this note may be an electronic transcription/translation of spoken language to printed text using the Dragon Dictation System.

## 2024-04-19 ENCOUNTER — TELEMEDICINE (OUTPATIENT)
Dept: PSYCHIATRY | Facility: CLINIC | Age: 54
End: 2024-04-19
Payer: COMMERCIAL

## 2024-04-19 DIAGNOSIS — F41.1 GAD (GENERALIZED ANXIETY DISORDER): Primary | ICD-10-CM

## 2024-04-19 PROCEDURE — 90837 PSYTX W PT 60 MINUTES: CPT | Performed by: COUNSELOR

## 2024-04-19 NOTE — PROGRESS NOTES
Date: April 22, 2024  Time In: 8:08  Time out: 9:02      This provider is located at the [facility] ([facility]), [address], [place] [address] using a secure [employer] Video Visit through [employer]. Patient is being seen remotely via telehealth at home address in [place] and stated they are in a secure environment for this session. The patient's condition being diagnosed/treated is appropriate for telemedicine. The provider identified herself as well as her credentials. The patient, and/or patients guardian, consent to be seen remotely, and when consent is given they understand that the consent allows for patient identifiable information to be sent to a third party as needed. They may refuse to be seen remotely at any time. The electronic data is encrypted and password protected, and the patient and/or guardian has been advised of the potential risks to privacy not withstanding such measures.     You have chosen to receive care through a telehealth visit.  Do you consent to use a video/audio connection for your medical care today? Yes    Subjective   Jayden Stafford is a 53 y.o. male who presents today for follow up appointment.    Chief Complaint:   Chief Complaint   Patient presents with    Anxiety        History of Present Illness: Rapport was established through conversation and unconditional positive regard. Recent events were discussed and how these events impact Pt's emotional health.   Pt reports successful use of learned coping skills 4 out of 10 times since last report.  Pt reports continuation of symptoms and states the intensity and duration of symptoms has remained unchanged since last report. Pt rates anxiety at 7/10 and depression at 5/10.     Sleep: Pt reports sleep has remained unchanged since last report. Healthy sleep habits were discussed such as maintaining a schedule, routine, avoiding caffeine, and limiting screen time before  "bed.    Appetite:  Pt reports appetite has been good since last report.  Discussed the importance of hydration and eating a healthy diet for overall and mental health.    Medication compliance: Pt reports medications are being taken daily as prescribed. Discussed the importance of compliance with prescriber's directions and Pt was instructed to report questions/concerns, as well as missed doses or discontinuation of medication by Pt.     Safety Plan in Place: No Pt denies SI/HI/SIB recent or current    Daily Functioning:  Since last report, Pt states symptoms are causing Mild difficulty in daily functioning.      Subjective Report:  Pt states he has been traveling less with his new job and this is good.  Pt states \"it's been good\" when asked about work anxiety.  Pt states he continues to stress out with the puppy, as Pt is finding it frustrating with others in the home when they do not follow his advice on how to train the dog.  Pt cares for the dog when he is home and wife is at work. Pt states a lot of his current anxiety comes from not feeling heard by s/o and son. Pt reports he has to provide specific instructions for his adult son on taking care of things around the house when pt is traveling for work, and having to repeat these instructions and expectations is causing some irritability and exasperation for Pt. Pt states he is having pre-emptive anxiety about how things will be done and having to deal with this when he gets home at the end of the week.   .  Reviewed coping skills and encouraged Pt to continue to practicing coping skills daily when not under distress. Pt was praised for their attempts to decrease symptoms and mitigate activating events.    Clinical Maneuvering/Intervention:  CBT was utilized to encourage Pt to identify maladaptive thoughts and behaviors and replace with more affirming and positive.Pt encouraged to set and maintain appropriate and healthy boundaries with others. Pt was instructed " to practice daily using appropriate and specific words to communicate feelings to others  Encouraged Pt to gain a feeling of empowerment over their situation by being cognizant of the things they are able to manipulate and control, such as choosing an outfit for the day or what they will have for lunch. Motivational interviewing used to encourage Pt to identify strengths which can be utilized in working toward treatment goals. Pt encouraged to practice daily learned skills such as controlled breathing, grounding, and mindfulness. Pt was encouraged to ask for help from support persons to assist them in maintaining stability and alleviate symptoms. Discussed the importance of being one's own mental health advocate and to refrain from seeing the need to ask for help as a weakness. Pt was encouraged to formulate a plan of action to be more proactive in managing stressors and refrain from using reactive and automatic heightened emotional responses.     Solution-focused therapy employed to identify how Pt would like their life to be if they were to make positive changes. Pt encouraged to identify effective coping skills and strengths they can use to continue utilizing those skills. Pt encouraged to discontinue utilizing non-effective coping mechanisms. Pt provided with feedback to highlight achievements and personal and other resources. Encouraged use of SMART goals    Assisted patient in processing above session content; acknowledged and normalized patient’s thoughts, feelings, and concerns.  Rationalized patient thought process regarding concerns presented at session.  Discussed triggers associated with patient's  anxiety  and obsessive thoughts Also discussed coping skills for patient to implement such as self care  and setting boundaries, and effectively communicating wants and needs to others.    Allowed patient to freely discuss issues without interruption or judgment. Provided safe, confidential environment to  facilitate the development of positive therapeutic relationship and encourage open, honest communication. Assisted patient in identifying risk factors which would indicate the need for higher level of care including thoughts to harm self or others and/or self-harming behavior and encouraged patient to contact this office, call 911, or present to the nearest emergency room should any of these events occur. Discussed crisis intervention services and means to access. Patient adamantly and convincingly denies current suicidal or homicidal ideation or perceptual disturbance.    Assessment:     Patient appears to maintain relative stability as compared to their baseline.  However, patient persistently struggles with symptoms which continue to cause impairment in important areas of functioning.  As a result, they can be reasonably expected to continue to benefit from treatment and would likely be at increased risk for decompensation otherwise.      PHQ-Score Total:  PHQ-9 Total Score: 11    PAMELLA-7 Score Total:         Mental Status Exam:   Hygiene:   good  Cooperation:  Cooperative  Eye Contact:  Good  Psychomotor Behavior:  Appropriate  Affect:  Full range  Mood: anxious  Speech:  Normal  Thought Process:  Goal directed  Thought Content:  Normal  Suicidal:  None  Homicidal:  None  Hallucinations:  None  Delusion:  None  Memory:  Intact  Orientation:  Grossly intact  Reliability:  good  Insight:  Good  Judgement:  Good  Impulse Control:  Good  Physical/Medical Issues:  No        Functional Status: Mild impairment     Progress toward goal: Not at goal    Prognosis: Good with continued therapeutic intervention        Plan:    Patient will continue in individual outpatient therapy with focus on improved functioning and coping skills, maintaining stability, and avoiding decompensation and the need for higher level of care.    Patient will adhere to medication regimen as prescribed and report any side effects. Patient will  contact this office, call 911 or present to the nearest emergency room should suicidal or homicidal ideations occur. Provide Cognitive Behavioral Therapy and Solution Focused Therapy to improve functioning, maintain stability, and avoid decompensation and the need for higher level of care.     Return in about 2 weeks (around 5/3/2024).      VISIT DIAGNOSIS:    Diagnosis Plan   1. PAMELLA (generalized anxiety disorder)         08:07 EDT       This document has been electronically signed by REZA Martinez  April 22, 2024      Part of this note may be an electronic transcription/translation of spoken language to printed text using the Dragon Dictation System.

## 2024-05-10 ENCOUNTER — TELEMEDICINE (OUTPATIENT)
Dept: PSYCHIATRY | Facility: CLINIC | Age: 54
End: 2024-05-10
Payer: COMMERCIAL

## 2024-05-10 DIAGNOSIS — F33.0 MDD (MAJOR DEPRESSIVE DISORDER), RECURRENT EPISODE, MILD: ICD-10-CM

## 2024-05-10 DIAGNOSIS — F41.1 GAD (GENERALIZED ANXIETY DISORDER): Primary | ICD-10-CM

## 2024-06-14 ENCOUNTER — TELEMEDICINE (OUTPATIENT)
Dept: PSYCHIATRY | Facility: CLINIC | Age: 54
End: 2024-06-14
Payer: COMMERCIAL

## 2024-06-14 DIAGNOSIS — F41.1 GAD (GENERALIZED ANXIETY DISORDER): Primary | ICD-10-CM

## 2024-06-14 NOTE — PROGRESS NOTES
Date: June 14, 2024  Time In: 8:04  Time out: 9:03      This provider is located at the [facility]through [facility]), [address], [place] [address] using a secure [employer] Video Visit through [employer]. Patient is being seen remotely via telehealth at home address in [place] and stated they are in a secure environment for this session. The patient's condition being diagnosed/treated is appropriate for telemedicine. The provider identified herself as well as her credentials. The patient, and/or patients guardian, consent to be seen remotely, and when consent is given they understand that the consent allows for patient identifiable information to be sent to a third party as needed. They may refuse to be seen remotely at any time. The electronic data is encrypted and password protected, and the patient and/or guardian has been advised of the potential risks to privacy not withstanding such measures.     You have chosen to receive care through a telehealth visit.  Do you consent to use a video/audio connection for your medical care today? Yes    Subjective   Jayden Stafford is a 53 y.o. male who presents today fully oriented, appropriately dressed and groomed, and open to communication for follow up appointment.    Chief Complaint:   Chief Complaint   Patient presents with    Anxiety    Sleeping Problem        History of Present Illness: Rapport was established through conversation and unconditional positive regard. Recent events were discussed and how these events impact Pt's emotional health.   Pt reports successful use of learned coping skills 4 out of 10 times since last report.  Pt reports continuation of symptoms and states the intensity and duration of symptoms has worsened since last report. Pt rates anxiety at 10/10 and depression at 0/10.     Sleep: Pt reports sleep has remained unchanged since last report. Healthy sleep habits were discussed such as maintaining  a schedule, routine, avoiding caffeine, and limiting screen time before bed.  Sleep continues to be poor and Pt states his travel and anxiety lends to delay in onset and some non-restorative sleep.  Wakes early and cannot go back to sleep.     Appetite:  Pt reports appetite has been good since last report.  Discussed the importance of hydration and eating a healthy diet for overall and mental health.    Medication compliance: Pt reports medications are being taken daily as prescribed. Discussed the importance of compliance with prescriber's directions and Pt was instructed to report questions/concerns, as well as missed doses or discontinuation of medication by Pt.     Safety Plan in Place: No Pt denies SI/HI/SIB recent or current    Daily Functioning:  Since last report, Pt states symptoms are causing Mild difficulty in daily functioning.      Subjective Report:  Pt states he has been very agitated with his wife's actions and statements lately.  Pt reports he just dropped wife off at airport for her cruise with her sisters.  Pt states he is often triggered by s/o's inconsiderate actions and remarks. Pt    States he gets frustrated because he is away most of the week and when he comes home he would like to be in a more inviting and relaxing environment. States they have had couples counseling with their [employer] a few years back and it was not helpful. Pt reports he does verbalize his concerns, and admits it is not always in a positive manner, especially when he has to do this repeatedly.  Pt states he gets tired of saying the same thing in different ways with no positive result.  Discussed how Pt can vent his emotions and refrain from allowing it to build. Pt was allowed to openly verbally process his feelings and emotions and this was helpful for Pt.  Reviewed calming skills and boundary setting.  .  Reviewed coping skills and encouraged Pt to continue to practicing coping skills daily when not under distress. Pt  was praised for their attempts to decrease symptoms and mitigate activating events.    Clinical Maneuvering/Intervention:  CBT was utilized to encourage Pt to identify maladaptive thoughts and behaviors and replace with more affirming and positive.Pt encouraged to set and maintain appropriate and healthy boundaries with others. Pt was instructed to practice daily using appropriate and specific words to communicate feelings to others.  Motivational interviewing used to encourage Pt to identify strengths which can be utilized in working toward treatment goals. Pt encouraged to practice daily learned skills such as controlled breathing, grounding, and mindfulness. Pt was encouraged to ask for help from support persons to assist them in maintaining stability and alleviate symptoms. Discussed the importance of being one's own mental health advocate and to refrain from seeing the need to ask for help as a weakness. Pt was encouraged to formulate a plan of action to be more proactive in managing stressors and refrain from using reactive and automatic heightened emotional responses.     Solution-focused therapy employed to identify how Pt would like their life to be if they were to make positive changes. Pt encouraged to identify effective coping skills and strengths they can use to continue utilizing those skills. Pt encouraged to discontinue utilizing non-effective coping mechanisms. Pt provided with feedback to highlight achievements and personal and other resources. Encouraged use of SMART goals    Assisted patient in processing above session content; acknowledged and normalized patient’s thoughts, feelings, and concerns.  Rationalized patient thought process regarding concerns presented at session.  Discussed triggers associated with patient's  anxiety  Also discussed coping skills for patient to implement such as self care , positive self talk , and effectively communicating feelings and emotions to others.      Allowed patient to freely discuss issues without interruption or judgment. Provided safe, confidential environment to facilitate the development of positive therapeutic relationship and encourage open, honest communication. Assisted patient in identifying risk factors which would indicate the need for higher level of care including thoughts to harm self or others and/or self-harming behavior and encouraged patient to contact this office, call 911, or present to the nearest emergency room should any of these events occur. Discussed crisis intervention services and means to access. Patient adamantly and convincingly denies current suicidal or homicidal ideation or perceptual disturbance.    Assessment:     Patient appears to maintain relative stability as compared to their baseline.  However, patient persistently struggles with symptoms which continue to cause impairment in important areas of functioning.  As a result, they can be reasonably expected to continue to benefit from treatment and would likely be at increased risk for decompensation otherwise.      PHQ-Score Total:  PHQ-9 Total Score: 1    PAMELLA-7 Score Total:         Mental Status Exam:   Hygiene:   good  Cooperation:  Cooperative  Eye Contact:  Good  Psychomotor Behavior:  Appropriate  Affect:  Full range  Mood: anxious and irritable  Speech:  Normal  Thought Process:  Goal directed  Thought Content:  Normal  Suicidal:  None  Homicidal: None  Hallucinations:  None  Delusion: None  Memory:  Intact  Orientation:  Grossly Intact  Reliability:  good  Insight:  Good  Judgement:  Good  Impulse Control:  Good  Physical/Medical Issues:  No        Functional Status: Moderate impairment     Progress toward goal: Not at goal    Prognosis: Good with continued therapeutic intervention        Plan:    Patient will continue in individual outpatient therapy with focus on improved functioning and coping skills, maintaining stability, and avoiding decompensation and the  need for higher level of care.    Patient will adhere to medication regimen as prescribed and report any side effects. Patient will contact this office, call 911 or present to the nearest emergency room should suicidal or homicidal ideations occur. Provide Cognitive Behavioral Therapy and Solution Focused Therapy to improve functioning, maintain stability, and avoid decompensation and the need for higher level of care.     Return in about 2 weeks (around 6/28/2024).      VISIT DIAGNOSIS:    Diagnosis Plan   1. PAMELLA (generalized anxiety disorder)         08:04 EDT       This document has been electronically signed by REZA Martinez  June 14, 2024      Part of this note may be an electronic transcription/translation of spoken language to printed text using the Dragon Dictation System.

## 2024-08-02 ENCOUNTER — TELEMEDICINE (OUTPATIENT)
Dept: PSYCHIATRY | Facility: CLINIC | Age: 54
End: 2024-08-02
Payer: COMMERCIAL

## 2024-08-02 DIAGNOSIS — F41.1 GAD (GENERALIZED ANXIETY DISORDER): Primary | ICD-10-CM

## 2024-08-02 DIAGNOSIS — F33.0 MDD (MAJOR DEPRESSIVE DISORDER), RECURRENT EPISODE, MILD: ICD-10-CM

## 2024-08-02 NOTE — PROGRESS NOTES
Date: August 2, 2024  Time In: 8:01  Time out: 9:03      This provider is located at the [facility] (through [facility]), [address], [place] [address] using a secure [employer] Video Visit through [employer]. Patient is being seen remotely via telehealth at home address in [place] and stated they are in a secure environment for this session. The patient's condition being diagnosed/treated is appropriate for telemedicine. The provider identified herself as well as her credentials. The patient, and/or patients guardian, consent to be seen remotely, and when consent is given they understand that the consent allows for patient identifiable information to be sent to a third party as needed. They may refuse to be seen remotely at any time. The electronic data is encrypted and password protected, and the patient and/or guardian has been advised of the potential risks to privacy not withstanding such measures.     You have chosen to receive care through a telehealth visit.  Do you consent to use a video/audio connection for your medical care today? Yes    Subjective   Jayden Stafford is a 53 y.o. male who presents today fully oriented, appropriately dressed and groomed, and open to communication for follow up appointment.    Chief Complaint: No chief complaint on file.       History of Present Illness: Rapport was established through conversation and unconditional positive regard. Recent events were discussed and how these events impact Pt's emotional health.   Pt reports successful use of learned coping skills 7 out of 10 times since last report.  Pt reports continuation of symptoms and states the intensity and duration of symptoms has worsened since last report. Pt rates anxiety at 7/10 and depression at 3/10.     Sleep: Pt reports sleep has remained unchanged somewhat since last report. Healthy sleep habits were discussed such as maintaining a schedule, routine, avoiding  caffeine, and limiting screen time before bed. Pt reports continued difficulty with sleep. States he has d/c drinking beer and he hoped this would help with sleep. Pt states he continues to struggle with insomnia nightly.     Appetite:  Pt reports appetite has been fair since last report.  Discussed the importance of hydration and eating a healthy diet for overall and mental health.  Eating a little bit more and pt attributes this to increase in anxiety.      Medication compliance: Pt reports medications are being taken daily as prescribed. Discussed the importance of compliance with prescriber's directions and Pt was instructed to report questions/concerns, as well as missed doses or discontinuation of medication by Pt.     Safety Plan in Place: No Pt denies SI/HI/SIB recent or current    Daily Functioning:  Since last report, Pt states symptoms are causing Moderate difficulty in daily functioning.      Subjective Report:  Pt states two weekends ago Pt had done a lot of yard work and he had a little too much beer that day. Pt came into the kitchen and he passed out in the kitchen and hit his head.  Son came in and found Pt on the floor.  Went by ambulance to the hospital and Pt was negative for concussion or injury.  Pt received hydration. States he has not had anything to drink since then. Reports d/c alcohol has not improved his sleep.  States he has some guilt and he is upset that he had to subject his son to the situation.  Pt was allowed to verbally process feelings associated with his event, as well as some disappointment in his extended family.  Through dialogue, Pt was able to gain some insight and this was helpful.  Pt's feelings were normalized and validated. Pt agrees he needs to proceed with getting the sleep study recommended by his Dr and Pt will contact provider to schedule.     .  Reviewed coping skills and encouraged Pt to continue to practicing coping skills daily when not under distress. Pt was  praised for their attempts to decrease symptoms and mitigate activating events.    Clinical Maneuvering/Intervention:  CBT was utilized to encourage Pt to identify maladaptive thoughts and behaviors and replace with more affirming and positive.Pt encouraged to set and maintain appropriate and healthy boundaries with others. Pt was instructed to practice daily using appropriate and specific words to communicate feelings to others.  Motivational interviewing used to encourage Pt to identify strengths which can be utilized in working toward treatment goals. Pt encouraged to practice daily learned skills such as controlled breathing, grounding, and mindfulness. Pt was encouraged to ask for help from support persons to assist them in maintaining stability and alleviate symptoms. Discussed the importance of being one's own mental health advocate and to refrain from seeing the need to ask for help as a weakness. Pt was encouraged to formulate a plan of action to be more proactive in managing stressors and refrain from using reactive and automatic heightened emotional responses.     Solution-focused therapy employed to identify how Pt would like their life to be if they were to make positive changes. Pt encouraged to identify effective coping skills and strengths they can use to continue utilizing those skills. Pt encouraged to discontinue utilizing non-effective coping mechanisms. Pt provided with feedback to highlight achievements and personal and other resources. Encouraged use of SMART goals    Assisted patient in processing above session content; acknowledged and normalized patient’s thoughts, feelings, and concerns.  Rationalized patient thought process regarding concerns presented at session.  Discussed triggers associated with patient's  anxiety  and insomnia  Also discussed coping skills for patient to implement such as self care  and positive self talk     Allowed patient to freely discuss issues without  interruption or judgment. Provided safe, confidential environment to facilitate the development of positive therapeutic relationship and encourage open, honest communication. Assisted patient in identifying risk factors which would indicate the need for higher level of care including thoughts to harm self or others and/or self-harming behavior and encouraged patient to contact this office, call 911, or present to the nearest emergency room should any of these events occur. Discussed crisis intervention services and means to access. Patient adamantly and convincingly denies current suicidal or homicidal ideation or perceptual disturbance.    Assessment:     Patient appears to maintain relative stability as compared to their baseline.  However, patient persistently struggles with symptoms which continue to cause impairment in important areas of functioning.  As a result, they can be reasonably expected to continue to benefit from treatment and would likely be at increased risk for decompensation otherwise.      PHQ-Score Total:  PHQ-9 Total Score: 1    PAMELLA-7 Score Total:         Mental Status Exam:   Hygiene:   good  Cooperation:  Cooperative  Eye Contact:  Good  Psychomotor Behavior:  Appropriate  Affect:  Full range  Mood: anxious  Speech:  Normal  Thought Process:  Goal directed  Thought Content:  Normal  Suicidal:  None  Homicidal: None  Hallucinations:  None  Delusion: None  Memory:  Intact  Orientation:  Grossly Intact  Reliability:  good  Insight:  Good  Judgement:  Good  Impulse Control:  Good  Physical/Medical Issues:  No        Functional Status: Moderate impairment     Progress toward goal: Not at goal    Prognosis: Good with continued therapeutic intervention        Plan:    Patient will continue in individual outpatient therapy with focus on improved functioning and coping skills, maintaining stability, and avoiding decompensation and the need for higher level of care.    Patient will adhere to medication  regimen as prescribed and report any side effects. Patient will contact this office, call 911 or present to the nearest emergency room should suicidal or homicidal ideations occur. Provide Cognitive Behavioral Therapy and Solution Focused Therapy to improve functioning, maintain stability, and avoid decompensation and the need for higher level of care.     Return in about 2 weeks (around 8/16/2024).      VISIT DIAGNOSIS:    Diagnosis Plan   1. PAMELLA (generalized anxiety disorder)        2. MDD (major depressive disorder), recurrent episode, mild         08:01 EDT       This document has been electronically signed by REZA Martinez  August 2, 2024      Part of this note may be an electronic transcription/translation of spoken language to printed text using the Dragon Dictation System.

## 2024-09-13 ENCOUNTER — TELEMEDICINE (OUTPATIENT)
Dept: PSYCHIATRY | Facility: CLINIC | Age: 54
End: 2024-09-13
Payer: COMMERCIAL

## 2024-09-13 DIAGNOSIS — F33.0 MDD (MAJOR DEPRESSIVE DISORDER), RECURRENT EPISODE, MILD: ICD-10-CM

## 2024-09-13 DIAGNOSIS — F41.1 GAD (GENERALIZED ANXIETY DISORDER): Primary | ICD-10-CM

## 2024-09-13 NOTE — PROGRESS NOTES
Date: September 13, 2024  Time In: 8:01  Time out: 9:02      This provider is located at the [facility]through [facility]), [address], [place] [address] using a secure [employer] Video Visit through [employer]. Patient is being seen remotely via telehealth at home address in [place] and stated they are in a secure environment for this session. The patient's condition being diagnosed/treated is appropriate for telemedicine. The provider identified herself as well as her credentials. The patient, and/or patients guardian, consent to be seen remotely, and when consent is given they understand that the consent allows for patient identifiable information to be sent to a third party as needed. They may refuse to be seen remotely at any time. The electronic data is encrypted and password protected, and the patient and/or guardian has been advised of the potential risks to privacy not withstanding such measures.     You have chosen to receive care through a telehealth visit.  Do you consent to use a video/audio connection for your medical care today? Yes    Subjective   Jayden Stafford is a 54 y.o. male who presents today fully oriented, appropriately dressed and groomed, and open to communication for follow up appointment.    Chief Complaint:   Chief Complaint   Patient presents with    Anxiety    Sleeping Problem        History of Present Illness: Rapport was established through conversation and unconditional positive regard. Recent events were discussed and how these events impact Pt's emotional health.   Pt reports successful use of learned coping skills 7 out of 10 times since last report.  Pt reports continuation of symptoms and states the intensity and duration of symptoms has improved since last report. Pt rates anxiety at 1/10 and depression at 1/10.     Sleep: Pt reports sleep has remained unchanged since last report. Healthy sleep habits were discussed such as  maintaining a schedule, routine, avoiding caffeine, and limiting screen time before bed.  Sleep continues to be disturbed and often non-restorative.  States he plans to reach out to PCP about getting sleep study done.     Appetite:  Pt reports appetite has been good since last report.  Discussed the importance of hydration and eating a healthy diet for overall and mental health.    Medication compliance: Pt reports medications are being taken daily as prescribed. Discussed the importance of compliance with prescriber's directions and Pt was instructed to report questions/concerns, as well as missed doses or discontinuation of medication by Pt.     Safety Plan in Place: No Pt denies SI/HI/SIB recent or current    Daily Functioning:  Since last report, Pt states symptoms are causing Mild difficulty in daily functioning.      Content Discussion:   Pt reports life updates since previous session. Pt identified current stressors. Pt acknowledged stressors within and outside of one's control. Pt identified successes and issues with utilizing coping mechanisms.  Pt processed some frustration over having to travel for work and encountering airport delays. Pt states he has decreased his drinking beer since an episode a few weeks ago of passing out after mowing and having beer. Pt states he was concerned enough that he decided to be cognizant of how much he is consuming.  Pt reports he has a couple of beers watching sports or socializing.    Counselor supported Pt in continued exploration of underlying belief systems which contribute to difficulty in connecting/vulnerability.  Through discussion, Pt identifies themes of preservation and overt emotional and physical reactivity when physical and/or emotional safety is in question, even in low or perceived threatening situations. Counselor supported Pt in identifying past experiences and underlying beliefs which contribute to these feelings and reactions.  Pt was supported and  encouraged in processing emotions related to frustrations with the expectations of self and others.  Pt was encouraged to identify cultural and nuclear familial contexts which contribute to these concerns.  Pt was receptive and engaged in conversation, and Pt reports this was beneficial and applicable to their situation.      Reviewed coping skills and encouraged Pt to continue to practicing coping skills daily when not under distress. Pt was praised for their attempts to decrease symptoms and mitigate activating events.    Clinical Maneuvering/Intervention:  CBT was utilized to encourage Pt to identify maladaptive thoughts and behaviors and replace with more affirming and positive.Pt encouraged to set and maintain appropriate and healthy boundaries with others. Pt was instructed to practice daily using appropriate and specific words to communicate feelings to others.  Motivational interviewing used to encourage Pt to identify strengths which can be utilized in working toward treatment goals. Pt encouraged to practice daily learned skills such as controlled breathing, grounding, and mindfulness. Pt was encouraged to ask for help from support persons to assist them in maintaining stability and alleviate symptoms. Discussed the importance of being one's own mental health advocate and to refrain from seeing the need to ask for help as a weakness. Pt was encouraged to formulate a plan of action to be more proactive in managing stressors and refrain from using reactive and automatic heightened emotional responses.     Solution-focused therapy employed to identify how Pt would like their life to be if they were to make positive changes. Pt encouraged to identify effective coping skills and strengths they can use to continue utilizing those skills. Pt encouraged to discontinue utilizing non-effective coping mechanisms. Pt provided with feedback to highlight achievements and personal and other resources. Encouraged use of  SMART goals    Assisted patient in processing above session content; acknowledged and normalized patient’s thoughts, feelings, and concerns.  Rationalized patient thought process regarding concerns presented at session.  Discussed triggers associated with patient's  anxiety  and depression  Also discussed coping skills for patient to implement such as mindfulness , self care , and positive self talk     Allowed patient to freely discuss issues without interruption or judgment. Provided safe, confidential environment to facilitate the development of positive therapeutic relationship and encourage open, honest communication. Assisted patient in identifying risk factors which would indicate the need for higher level of care including thoughts to harm self or others and/or self-harming behavior and encouraged patient to contact this office, call 911, or present to the nearest emergency room should any of these events occur. Discussed crisis intervention services and means to access. Patient adamantly and convincingly denies current suicidal or homicidal ideation or perceptual disturbance.    Assessment:     Patient appears to maintain relative stability as compared to their baseline.  However, patient persistently struggles with symptoms which continue to cause impairment in important areas of functioning.  As a result, they can be reasonably expected to continue to benefit from treatment and would likely be at increased risk for decompensation otherwise.      PHQ-Score Total:  PHQ-9 Total Score: 8    PAMELLA-7 Score Total:         Mental Status Exam:   Hygiene:   fair  Cooperation:  Cooperative  Eye Contact:  Good  Psychomotor Behavior:  Appropriate  Affect:  Full range  Mood: normal  Speech:  Normal  Thought Process:  Goal directed  Thought Content:  Normal  Suicidal:  None  Homicidal: None  Hallucinations:  None  Delusion: None  Memory:  Intact  Orientation:  Grossly Intact  Reliability:  good  Insight:  Good  Judgement:   Good  Impulse Control:  Good  Physical/Medical Issues:  No        Functional Status: Mild impairment     Progress toward goal: Not at goal    Prognosis: Good with continued therapeutic intervention        Plan:    Patient will continue in individual outpatient therapy with focus on improved functioning and coping skills, maintaining stability, and avoiding decompensation and the need for higher level of care.    Patient will adhere to medication regimen as prescribed and report any side effects. Patient will contact this office, call 911 or present to the nearest emergency room should suicidal or homicidal ideations occur. Provide Cognitive Behavioral Therapy and Solution Focused Therapy to improve functioning, maintain stability, and avoid decompensation and the need for higher level of care.     Return in about 2 weeks (around 9/27/2024).      VISIT DIAGNOSIS:    Diagnosis Plan   1. PAMELLA (generalized anxiety disorder)        2. MDD (major depressive disorder), recurrent episode, mild         08:02 EDT       This document has been electronically signed by REZA Martinez  September 13, 2024      Part of this note may be an electronic transcription/translation of spoken language to printed text using the Dragon Dictation System.

## 2024-09-27 ENCOUNTER — TELEMEDICINE (OUTPATIENT)
Dept: PSYCHIATRY | Facility: CLINIC | Age: 54
End: 2024-09-27
Payer: COMMERCIAL

## 2024-09-27 DIAGNOSIS — F41.1 GAD (GENERALIZED ANXIETY DISORDER): Primary | ICD-10-CM

## 2024-10-25 ENCOUNTER — TELEMEDICINE (OUTPATIENT)
Dept: PSYCHIATRY | Facility: CLINIC | Age: 54
End: 2024-10-25
Payer: COMMERCIAL

## 2024-10-25 DIAGNOSIS — F33.0 MDD (MAJOR DEPRESSIVE DISORDER), RECURRENT EPISODE, MILD: ICD-10-CM

## 2024-10-25 DIAGNOSIS — F41.1 GAD (GENERALIZED ANXIETY DISORDER): Primary | ICD-10-CM

## 2024-10-25 NOTE — PROGRESS NOTES
Date: October 26, 2024  Time In: 9:07  Time out: 10:08      This provider is located at the [facility]), [address], [place] [address] using a secure [employer] Video Visit through [employer]. Patient is being seen remotely via telehealth at home address in [place] and stated they are in a secure environment for this session. The patient's condition being diagnosed/treated is appropriate for telemedicine. The provider identified herself as well as her credentials. The patient, and/or patients guardian, consent to be seen remotely, and when consent is given they understand that the consent allows for patient identifiable information to be sent to a third party as needed. They may refuse to be seen remotely at any time. The electronic data is encrypted and password protected, and the patient and/or guardian has been advised of the potential risks to privacy not withstanding such measures.     You have chosen to receive care through a telehealth visit.  Do you consent to use a video/audio connection for your medical care today? Yes    Subjective   Jayden Stafford is a 54 y.o. male who presents today fully oriented, appropriately dressed and groomed, and open to communication for follow up appointment.    Chief Complaint:   Chief Complaint   Patient presents with    Anxiety    Depression        History of Present Illness: Rapport was established through conversation and unconditional positive regard. Recent events were discussed and how these events impact Pt's emotional health.   Pt reports successful use of learned coping skills 6 out of 10 times since last report.  Pt reports continuation of symptoms and states the intensity and duration of symptoms has remained unchanged since last report. Pt rates anxiety at 4/10 and depression at 0/10.     Sleep: Pt reports sleep has remained unchanged since last report. Healthy sleep habits were discussed such as  "maintaining a schedule, routine, avoiding caffeine, and limiting screen time before bed.    Appetite:  Pt reports appetite has been good since last report.  Discussed the importance of hydration and eating a healthy diet for overall and mental health.    Medication compliance: Pt reports medications are being taken daily as prescribed. Discussed the importance of compliance with prescriber's directions and Pt was instructed to report questions/concerns, as well as missed doses or discontinuation of medication by Pt.     Safety Plan in Place: No Pt denies SI/HI/SIB recent or current    Daily Functioning:  Since last report, Pt states symptoms are causing Moderate difficulty in daily functioning.      Content Discussion:   Pt reports life updates since previous session. Pt identified current stressors. Pt acknowledged stressors within and outside of one's control. Pt identified successes and issues with utilizing coping mechanisms.  Pt states he just got back from [place] after a [employer] work meeting.  Reports she was anxious leading up to it but it went well.  Reports he is still \"winding down from it.\"   States his flight last night was \"terrible\" due to unruly passengers. Pt reports he is doing better at letting go of agitation and is doing better about bringing work stress home. Reports his youngest son is home this weekend and they have plans for the weekend.  Processed some feelings about transgender son.     Counselor supported Pt in continued exploration of underlying belief systems which contribute to difficulty in connecting/vulnerability.  Through discussion, Pt identifies themes of preservation and overt emotional and physical reactivity when physical and/or emotional statis is in question, even in low or perceived threatening situations. Counselor supported Pt in identifying past experiences and underlying beliefs which contribute to these feelings and reactions.  Pt was supported and encouraged in " processing emotions related to frustrations with the expectations of self and others.  Pt was encouraged to identify cultural and nuclear familial contexts which contribute to these concerns.  Pt was receptive and engaged in conversation, and Pt reports this was beneficial and applicable to their situation.      Reviewed coping skills and encouraged Pt to continue to practicing coping skills daily when not under distress. Pt was praised for their attempts to decrease symptoms and mitigate activating events.    Clinical Maneuvering/Intervention:  CBT was utilized to encourage Pt to identify maladaptive thoughts and behaviors and replace with more affirming and positive.Pt encouraged to set and maintain appropriate and healthy boundaries with others. Pt was instructed to practice daily using appropriate and specific words to communicate feelings to others.  Motivational interviewing used to encourage Pt to identify strengths which can be utilized in working toward treatment goals. Pt encouraged to practice daily learned skills such as controlled breathing, grounding, and mindfulness. Pt was encouraged to ask for help from support persons to assist them in maintaining stability and alleviate symptoms. Discussed the importance of being one's own mental health advocate and to refrain from seeing the need to ask for help as a weakness. Pt was encouraged to formulate a plan of action to be more proactive in managing stressors and refrain from using reactive and automatic heightened emotional responses.     Solution-focused therapy employed to identify how Pt would like their life to be if they were to make positive changes. Pt encouraged to identify effective coping skills and strengths they can use to continue utilizing those skills. Pt encouraged to discontinue utilizing non-effective coping mechanisms. Pt provided with feedback to highlight achievements and personal and other resources. Encouraged use of SMART  goals    Assisted patient in processing above session content; acknowledged and normalized patient’s thoughts, feelings, and concerns.  Rationalized patient thought process regarding concerns presented at session.  Discussed triggers associated with patient's  anxiety  Also discussed coping skills for patient to implement such as controlled breathing , grounding , and positive self talk     Allowed patient to freely discuss issues without interruption or judgment. Provided safe, confidential environment to facilitate the development of positive therapeutic relationship and encourage open, honest communication. Assisted patient in identifying risk factors which would indicate the need for higher level of care including thoughts to harm self or others and/or self-harming behavior and encouraged patient to contact this office, call 911, or present to the nearest emergency room should any of these events occur. Discussed crisis intervention services and means to access. Patient adamantly and convincingly denies current suicidal or homicidal ideation or perceptual disturbance.    Assessment:     Patient appears to maintain relative stability as compared to their baseline.  However, patient persistently struggles with symptoms which continue to cause impairment in important areas of functioning.  As a result, they can be reasonably expected to continue to benefit from treatment and would likely be at increased risk for decompensation otherwise.           Mental Status Exam:   Hygiene:   good  Cooperation:  Cooperative  Eye Contact:  Good  Psychomotor Behavior:  Appropriate  Affect:  Full range  Mood: normal  Speech:  Normal  Thought Process:  Goal directed  Thought Content:  Normal  Suicidal:  None  Homicidal: None  Hallucinations:  None  Delusion: None  Memory:  Intact  Orientation:  Grossly Intact  Reliability:  good  Insight:  Good  Judgement:  Good  Impulse Control:  Good  Physical/Medical Issues:  No         Functional Status: Mild impairment     Progress toward goal: Not at goal    Prognosis: Good with continued therapeutic intervention        Plan:    Patient will continue in individual outpatient therapy with focus on improved functioning and coping skills, maintaining stability, and avoiding decompensation and the need for higher level of care.    Patient will adhere to medication regimen as prescribed and report any side effects. Patient will contact this office, call 911 or present to the nearest emergency room should suicidal or homicidal ideations occur. Provide Cognitive Behavioral Therapy and Solution Focused Therapy to improve functioning, maintain stability, and avoid decompensation and the need for higher level of care.     Return in about 3 years (around 10/25/2027).      VISIT DIAGNOSIS:    Diagnosis Plan   1. PAMELLA (generalized anxiety disorder)        2. MDD (major depressive disorder), recurrent episode, mild         09:08 EDT       This document has been electronically signed by REZA Martinez  October 26, 2024      Part of this note may be an electronic transcription/translation of spoken language to printed text using the Dragon Dictation System.

## 2024-11-08 ENCOUNTER — TELEMEDICINE (OUTPATIENT)
Dept: PSYCHIATRY | Facility: CLINIC | Age: 54
End: 2024-11-08
Payer: COMMERCIAL

## 2024-11-08 DIAGNOSIS — F41.1 GAD (GENERALIZED ANXIETY DISORDER): Primary | ICD-10-CM

## 2024-11-08 NOTE — PROGRESS NOTES
Date: November 8, 2024  Time In: 9:05  Time out: 10:02      This provider is located at the [facility]), [address], [place] [address] using a secure [employer] Video Visit through [employer]. Patient is being seen remotely via telehealth at home address in [place] and stated they are in a secure environment for this session. The patient's condition being diagnosed/treated is appropriate for telemedicine. The provider identified herself as well as her credentials. The patient, and/or patients guardian, consent to be seen remotely, and when consent is given they understand that the consent allows for patient identifiable information to be sent to a third party as needed. They may refuse to be seen remotely at any time. The electronic data is encrypted and password protected, and the patient and/or guardian has been advised of the potential risks to privacy not withstanding such measures.     You have chosen to receive care through a telehealth visit.  Do you consent to use a video/audio connection for your medical care today? Yes    Subjective   Jayden Stafford is a 54 y.o. male who presents today fully oriented, appropriately dressed and groomed, and open to communication for follow up appointment.    Chief Complaint:   Chief Complaint   Patient presents with    Anxiety        History of Present Illness: Rapport was established through conversation and unconditional positive regard. Recent events were discussed and how these events impact Pt's emotional health.   Pt reports successful use of learned coping skills 7 out of 10 times since last report.  Pt reports continuation of symptoms and states the intensity and duration of symptoms has improved since last report. Pt rates anxiety at 0/10 and depression at 0/10.     Sleep: Pt reports sleep has remained unchanged since last report. Healthy sleep habits were discussed such as maintaining a schedule,  routine, avoiding caffeine, and limiting screen time before bed.    Appetite:  Pt reports appetite has been good since last report.  Discussed the importance of hydration and eating a healthy diet for overall and mental health.    Medication compliance: Pt reports medications are being taken daily as prescribed. Discussed the importance of compliance with prescriber's directions and Pt was instructed to report questions/concerns, as well as missed doses or discontinuation of medication by Pt.     Safety Plan in Place: No Pt denies SI/HI/SIB recent or current    Daily Functioning:  Since last report, Pt states symptoms are causing Moderate difficulty in daily functioning.      Content Discussion:   Pt reports life updates since previous session. Pt identified current stressors. Pt acknowledged stressors within and outside of one's control. Pt identified successes and issues with utilizing coping mechanisms.  States he has been in [facility] and [place] all week for work.  Pt explored anticipatory anxiety leading up to recent election.  Pt states he feels positive about the future at this time.  Pt was allowed to process some feelings associated with relationship with his transgender son.      Counselor supported Pt in continued exploration of underlying belief systems which contribute to difficulty in connecting/vulnerability.  Through discussion, Pt identifies themes of preservation and overt emotional and physical reactivity when physical and/or emotional statis is in question, even in low or perceived threatening situations. Counselor supported Pt in identifying past experiences and underlying beliefs which contribute to these feelings and reactions.  Pt was supported and encouraged in processing emotions related to frustrations with the expectations of self and others.  Pt was encouraged to identify cultural and nuclear familial contexts which contribute to these concerns.  Pt was receptive and engaged in conversation,  and Pt reports this was beneficial and applicable to their situation.      Reviewed coping skills and encouraged Pt to continue to practicing coping skills daily when not under distress. Pt was praised for their attempts to decrease symptoms and mitigate activating events.    Clinical Maneuvering/Intervention:  CBT was utilized to encourage Pt to identify maladaptive thoughts and behaviors and replace with more affirming and positive.Pt encouraged to set and maintain appropriate and healthy boundaries with others. Pt was instructed to practice daily using appropriate and specific words to communicate feelings to others.  Motivational interviewing used to encourage Pt to identify strengths which can be utilized in working toward treatment goals. Pt encouraged to practice daily learned skills such as controlled breathing, grounding, and mindfulness. Pt was encouraged to ask for help from support persons to assist them in maintaining stability and alleviate symptoms. Discussed the importance of being one's own mental health advocate and to refrain from seeing the need to ask for help as a weakness. Pt was encouraged to formulate a plan of action to be more proactive in managing stressors and refrain from using reactive and automatic heightened emotional responses.     Solution-focused therapy employed to identify how Pt would like their life to be if they were to make positive changes. Pt encouraged to identify effective coping skills and strengths they can use to continue utilizing those skills. Pt encouraged to discontinue utilizing non-effective coping mechanisms. Pt provided with feedback to highlight achievements and personal and other resources. Encouraged use of SMART goals    Assisted patient in processing above session content; acknowledged and normalized patient’s thoughts, feelings, and concerns.  Rationalized patient thought process regarding concerns presented at session.  Discussed triggers associated  with patient's  anxiety  Also discussed coping skills for patient to implement such as grounding , self care , and positive self talk     Allowed patient to freely discuss issues without interruption or judgment. Provided safe, confidential environment to facilitate the development of positive therapeutic relationship and encourage open, honest communication. Assisted patient in identifying risk factors which would indicate the need for higher level of care including thoughts to harm self or others and/or self-harming behavior and encouraged patient to contact this office, call 911, or present to the nearest emergency room should any of these events occur. Discussed crisis intervention services and means to access. Patient adamantly and convincingly denies current suicidal or homicidal ideation or perceptual disturbance.    Assessment:     Patient appears to maintain relative stability as compared to their baseline.  However, patient persistently struggles with symptoms which continue to cause impairment in important areas of functioning.  As a result, they can be reasonably expected to continue to benefit from treatment and would likely be at increased risk for decompensation otherwise.           Mental Status Exam:   Hygiene:   good  Cooperation:  Cooperative  Eye Contact:  Good  Psychomotor Behavior:  Appropriate  Affect:  Full range  Mood: normal  Speech:  Normal  Thought Process:  Goal directed  Thought Content:  Normal  Suicidal:  None  Homicidal: None  Hallucinations:  None  Delusion: None  Memory:  Intact  Orientation:  Grossly Intact  Reliability:  good  Insight:  Good  Judgement:  Good  Impulse Control:  Good  Physical/Medical Issues:  No        Functional Status: No impairment    Progress toward goal: Not at goal    Prognosis: Good with continued therapeutic intervention        Plan:    Patient will continue in individual outpatient therapy with focus on improved functioning and coping skills, maintaining  stability, and avoiding decompensation and the need for higher level of care.    Patient will adhere to medication regimen as prescribed and report any side effects. Patient will contact this office, call 911 or present to the nearest emergency room should suicidal or homicidal ideations occur. Provide Cognitive Behavioral Therapy and Solution Focused Therapy to improve functioning, maintain stability, and avoid decompensation and the need for higher level of care.     Return in about 3 weeks (around 11/29/2024).      VISIT DIAGNOSIS:    Diagnosis Plan   1. PAMELLA (generalized anxiety disorder)        2. MDD (major depressive disorder), recurrent episode, mild         09:05 EST       This document has been electronically signed by REZA Martinez  November 8, 2024      Part of this note may be an electronic transcription/translation of spoken language to printed text using the Dragon Dictation System.

## 2024-11-22 ENCOUNTER — TELEMEDICINE (OUTPATIENT)
Dept: PSYCHIATRY | Facility: CLINIC | Age: 54
End: 2024-11-22
Payer: COMMERCIAL

## 2024-11-22 DIAGNOSIS — F41.1 GAD (GENERALIZED ANXIETY DISORDER): Primary | ICD-10-CM

## 2024-11-22 DIAGNOSIS — F33.0 MDD (MAJOR DEPRESSIVE DISORDER), RECURRENT EPISODE, MILD: ICD-10-CM

## 2024-11-22 NOTE — PROGRESS NOTES
Date: November 22, 2024  Time In: 8:04  Time out: 8:48      This provider is located at the [facility] ([facility]), [address], [place] [address] using a secure [employer] Video Visit through [facility]. Patient is being seen remotely via telehealth at home address in [place] and stated they are in a secure environment for this session. The patient's condition being diagnosed/treated is appropriate for telemedicine. The provider identified herself as well as her credentials. The patient, and/or patients guardian, consent to be seen remotely, and when consent is given they understand that the consent allows for patient identifiable information to be sent to a third party as needed. They may refuse to be seen remotely at any time. The electronic data is encrypted and password protected, and the patient and/or guardian has been advised of the potential risks to privacy not withstanding such measures.     You have chosen to receive care through a telehealth visit.  Do you consent to use a video/audio connection for your medical care today? Yes      Pt is located at Home    Subjective   Jayden Stafford is a 54 y.o. male who presents today fully oriented, appropriately dressed and groomed, and open to communication for individual follow up appointment.    Chief Complaint:   Chief Complaint   Patient presents with    Anxiety    Depression        History of Present Illness: Rapport was established through conversation and unconditional positive regard. Recent events were discussed and how these events impact Pt's emotional health.   Pt reports successful use of learned coping skills since last report.  Pt reports continuation of symptoms and states the intensity and duration of symptoms has remained unchanged since last report. Pt rates anxiety at 3/10 and depression at 1/10.     Sleep: Pt reports sleep has remained unchanged since last report. Healthy sleep habits were  discussed such as maintaining a schedule, routine, avoiding caffeine, and limiting screen time before bed.    Appetite:  Pt reports appetite has been good since last report.  Discussed the importance of hydration and eating a healthy diet for overall and mental health.    Medication compliance: Pt reports medications are being taken daily as prescribed. Discussed the importance of compliance with prescriber's directions and Pt was instructed to report questions/concerns, as well as missed doses or discontinuation of medication by Pt.     Safety Plan in Place: No Pt denies SI/HI/SIB recent or current    Daily Functioning:  Since last report, Pt states symptoms are causing mild difficulty in daily functioning.      Content Discussion:   Pt reports life updates since previous session. Pt identified current stressors. Pt acknowledged stressors within and outside of one's control. Pt identified successes and issues with utilizing coping mechanisms.  Nervous about holidays  Pt's SANJUANITA is very passive aggressive, and this can cause some friction especially during holidays.  States he wants to watch his favorite football team in comfort and keep a low key Thanksgiving holiday.  Pt and wife are going on a cruise over [place] and Pt wants to just be home and comfortable. Pt's feelings were normalized and validated.  Discussed effective ways pt can communicate his preferences to family.     Counselor supported Pt in continued exploration of underlying belief systems which contribute to difficulty in connecting/vulnerability.  Through discussion, Pt identifies themes of preservation and overt emotional and physical reactivity when physical and/or emotional statis is in question, even in low or perceived threatening situations. Counselor supported Pt in identifying past experiences and underlying beliefs which contribute to these feelings and reactions.  Pt was supported and encouraged in processing emotions related to  frustrations with the expectations of self and others.  Pt was encouraged to identify cultural and nuclear familial contexts which contribute to these concerns.  Pt was receptive and engaged in conversation, and Pt reports this was beneficial and applicable to their situation.      Reviewed coping skills and encouraged Pt to continue to practicing coping skills daily when not under distress. Pt was praised for their attempts to decrease symptoms and mitigate activating events.    Clinical Maneuvering/Intervention:  CBT was utilized to encourage Pt to identify maladaptive thoughts and behaviors and replace with more affirming and positive.Pt encouraged to set and maintain appropriate and healthy boundaries with others. Pt was instructed to practice daily using appropriate and specific words to communicate feelings to others.  Motivational interviewing used to encourage Pt to identify strengths which can be utilized in working toward treatment goals. Pt encouraged to practice daily learned skills such as controlled breathing, grounding, and mindfulness. Pt was encouraged to ask for help from support persons to assist them in maintaining stability and alleviate symptoms. Discussed the importance of being one's own mental health advocate and to refrain from seeing the need to ask for help as a weakness. Pt was encouraged to formulate a plan of action to be more proactive in managing stressors and refrain from using reactive and automatic heightened emotional responses.     Solution-focused therapy employed to identify how Pt would like their life to be if they were to make positive changes. Pt encouraged to identify effective coping skills and strengths they can use to continue utilizing those skills. Pt encouraged to discontinue utilizing non-effective coping mechanisms. Pt provided with feedback to highlight achievements and personal and other resources. Encouraged use of SMART goals    Assisted patient in processing  above session content; acknowledged and normalized patient’s thoughts, feelings, and concerns.  Rationalized patient thought process regarding concerns presented at session.  Discussed triggers associated with patient's  anxiety  and depression  Also discussed coping skills for patient to implement such as increasing activity , self care , and positive self talk     Allowed patient to freely discuss issues without interruption or judgment. Provided safe, confidential environment to facilitate the development of positive therapeutic relationship and encourage open, honest communication. Assisted patient in identifying risk factors which would indicate the need for higher level of care including thoughts to harm self or others and/or self-harming behavior and encouraged patient to contact this office, call 911, or present to the nearest emergency room should any of these events occur. Discussed crisis intervention services and means to access. Patient adamantly and convincingly denies current suicidal or homicidal ideation or perceptual disturbance.    Assessment:     Patient appears to maintain relative stability as compared to their baseline.  However, patient persistently struggles with symptoms which continue to cause impairment in important areas of functioning.  As a result, they can be reasonably expected to continue to benefit from treatment and would likely be at increased risk for decompensation otherwise.      PHQ-Score Total:  PHQ-9 Total Score: PHQ-9 Depression Screening  Little interest or pleasure in doing things?  2   Feeling down, depressed, or hopeless?  1   PHQ-2 Total Score     Trouble falling or staying asleep, or sleeping too much?  3   Feeling tired or having little energy?  2   Poor appetite or overeating?  0   Feeling bad about yourself - or that you are a failure or have let yourself or your family down?  0   Trouble concentrating on things, such as reading the newspaper or watching  television?  1   Moving or speaking so slowly that other people could have noticed? Or the opposite - being so fidgety or restless that you have been moving around a lot more than usual?  1   Thoughts that you would be better off dead, or of hurting yourself in some way?  0   PHQ-9 Total Score  10   If you checked off any problems, how difficult have these problems made it for you to do your work, take care of things at home, or get along with other people?  somewhat             Mental Status Exam:   Hygiene:   good  Cooperation:  Cooperative  Eye Contact:  Good  Psychomotor Behavior:  Appropriate  Affect:  Full range  Mood: anxious  Described anxiety loops  Speech:  Normal  Thought Process:  Goal directed  Thought Content:  Normal  Suicidal:  None  Homicidal: None  Hallucinations:  None  Delusion: None  Memory:  Intact  Orientation:  Grossly Intact  Reliability:  good  Insight:  Good  Judgement:  Good  Impulse Control:  Good  Physical/Medical Issues:  No        Functional Status: Mild impairment     Progress toward goal: Not at goal    Prognosis: Good with continued therapeutic intervention        Plan:    Patient will continue in individual outpatient therapy with focus on improved functioning and coping skills, maintaining stability, and avoiding decompensation and the need for higher level of care.    Patient will adhere to medication regimen as prescribed and report any side effects. Patient will contact this office, call 911 or present to the nearest emergency room should suicidal or homicidal ideations occur. Provide Cognitive Behavioral Therapy and Solution Focused Therapy to improve functioning, maintain stability, and avoid decompensation and the need for higher level of care.     Return in about 2 weeks (around 12/6/2024).      VISIT DIAGNOSIS:    Diagnosis Plan   1. PAMELLA (generalized anxiety disorder)        2. MDD (major depressive disorder), recurrent episode, mild         08:04 EST       This document  has been electronically signed by REZA Martinez  November 22, 2024      Part of this note may be an electronic transcription/translation of spoken language to printed text using the Dragon Dictation System.

## 2025-01-02 ENCOUNTER — TELEPHONE (OUTPATIENT)
Dept: PSYCHIATRY | Facility: CLINIC | Age: 55
End: 2025-01-02
Payer: COMMERCIAL

## 2025-01-02 NOTE — TELEPHONE ENCOUNTER
Attempted to reach out to pt regarding his insurance.  We need a new copy of his insurance card in his mychart.  Could not leave a vm the mailbox was full.

## 2025-01-03 ENCOUNTER — TELEPHONE (OUTPATIENT)
Dept: INTERNAL MEDICINE | Facility: CLINIC | Age: 55
End: 2025-01-03
Payer: COMMERCIAL

## 2025-01-03 DIAGNOSIS — I10 ESSENTIAL HYPERTENSION: ICD-10-CM

## 2025-01-17 ENCOUNTER — TELEMEDICINE (OUTPATIENT)
Dept: PSYCHIATRY | Facility: CLINIC | Age: 55
End: 2025-01-17
Payer: COMMERCIAL

## 2025-01-17 DIAGNOSIS — F41.1 GAD (GENERALIZED ANXIETY DISORDER): Primary | ICD-10-CM

## 2025-01-17 NOTE — PROGRESS NOTES
Date: January 20, 2025  Time In: 8:05  Time out: 9:06      This provider is located at the [facility] (through [facility]), [address], [place] [address] using a secure [employer] Video Visit through [employer]. Patient is being seen remotely via telehealth, and they are in a secure environment for this session. The patient's condition being diagnosed/treated is appropriate for telemedicine. The provider identified herself as well as her credentials. The patient, and/or patients guardian, consent to be seen remotely, and when consent is given they understand that the consent allows for patient identifiable information to be sent to a third party as needed. They may refuse to be seen remotely at any time. The electronic data is encrypted and password protected, and the patient and/or guardian has been advised of the potential risks to privacy not withstanding such measures.     Mode of Visit: Video  Location of patient: Home  Location of provider: Provider home  You have chosen to receive care through a telehealth visit.  The patient has signed the video visit consent form.  The visit included audio and video interaction. No technical issues occurred during this visit    Subjective   Jayden Stafford is a 54 y.o. male who presents today fully oriented, appropriately dressed and groomed, and open to communication for follow up appointment.    Chief Complaint:   Chief Complaint   Patient presents with    Anxiety    Sleeping Problem        History of Present Illness: Rapport was established through conversation and unconditional positive regard. Recent events were discussed and how these events impact Pt's emotional health.   Pt reports successful use of learned coping skills since last report.  Pt reports continuation of symptoms and states the intensity and duration of symptoms has remained unchanged since last report. Pt rates anxiety at 3/10 and depression at 0/10.     Sleep:  Pt reports sleep has remained unchanged since last report. Healthy sleep habits were discussed such as maintaining a schedule, routine, avoiding caffeine, and limiting screen time before bed.    Appetite:  Pt reports appetite has been good since last report.  Discussed the importance of hydration and eating a healthy diet for overall and mental health.    Medication compliance: Pt reports medications are being taken daily as prescribed. Discussed the importance of compliance with prescriber's directions and Pt was instructed to report questions/concerns, as well as missed doses or discontinuation of medication by Pt.     Safety Plan in Place: No Pt denies SI/HI/SIB recent or current    Daily Functioning:  Since last report, Pt states symptoms are causing Mild difficulty in daily functioning.      Content Discussion:   Pt reports life updates since previous session. Pt identified current stressors. Pt acknowledged stressors within and outside of one's control. Pt identified successes and issues with utilizing coping mechanisms.  States he and wife and adult sons went on a cruise and it was a good time. Pt states overall he is doing well with his anx and mood and he feels his sleep is about the same.  Pt processed some feelings about how his wife is very active in the Jainism, and this often is mildly bothersome to Pt, as wife plans activities with Jainism friends on the weekends which is the only time Pt is home from work. Pt was allowed to verbally process his frustrations.  Discussed ways Pt can communicate his concerns with wife.         Counselor supported Pt in continued exploration of underlying belief systems which contribute to difficulty in connecting/vulnerability.  Through discussion, Pt identifies themes of preservation and overt emotional and physical reactivity when physical and/or emotional statis is in question, even in low or perceived threatening situations. Counselor supported Pt in identifying past  experiences and underlying beliefs which contribute to these feelings and reactions.  Pt was supported and encouraged in processing emotions related to frustrations with the expectations of self and others.  Pt was encouraged to identify cultural and nuclear familial contexts which contribute to these concerns.  Pt was receptive and engaged in conversation, and Pt reports this was beneficial and applicable to their situation.      Reviewed coping skills and encouraged Pt to continue to practicing coping skills daily when not under distress. Pt was praised for their attempts to decrease symptoms and mitigate activating events.    Clinical Maneuvering/Intervention:  CBT was utilized to encourage Pt to identify maladaptive thoughts and behaviors and replace with more affirming and positive.Pt encouraged to set and maintain appropriate and healthy boundaries with others. Pt was instructed to practice daily using appropriate and specific words to communicate feelings to others.  Motivational interviewing used to encourage Pt to identify strengths which can be utilized in working toward treatment goals. Pt encouraged to practice daily learned skills such as controlled breathing, grounding, and mindfulness. Pt was encouraged to ask for help from support persons to assist them in maintaining stability and alleviate symptoms. Discussed the importance of being one's own mental health advocate and to refrain from seeing the need to ask for help as a weakness. Pt was encouraged to formulate a plan of action to be more proactive in managing stressors and refrain from using reactive and automatic heightened emotional responses.     Solution-focused therapy employed to identify how Pt would like their life to be if they were to make positive changes. Pt encouraged to identify effective coping skills and strengths they can use to continue utilizing those skills. Pt encouraged to discontinue utilizing non-effective coping  mechanisms. Pt provided with feedback to highlight achievements and personal and other resources. Encouraged use of SMART goals    Assisted patient in processing above session content; acknowledged and normalized patient’s thoughts, feelings, and concerns.  Rationalized patient thought process regarding concerns presented at session.  Discussed triggers associated with patient's anxiety and sleep issues.   Also discussed coping skills for patient to implement such as grounding , self care , and positive self talk     Allowed patient to freely discuss issues without interruption or judgment. Provided safe, confidential environment to facilitate the development of positive therapeutic relationship and encourage open, honest communication. Assisted patient in identifying risk factors which would indicate the need for higher level of care including thoughts to harm self or others and/or self-harming behavior and encouraged patient to contact this office, call 911, or present to the nearest emergency room should any of these events occur. Discussed crisis intervention services and means to access. Patient adamantly and convincingly denies current suicidal or homicidal ideation or perceptual disturbance.    Assessment:     Patient appears to maintain relative stability as compared to their baseline.  However, patient persistently struggles with symptoms which continue to cause impairment in important areas of functioning.  As a result, they can be reasonably expected to continue to benefit from treatment and would likely be at increased risk for decompensation otherwise.      PHQ-Score Total:  PHQ-9 Total Score: PHQ-9 Depression Screening  Little interest or pleasure in doing things?  0   Feeling down, depressed, or hopeless?  0   PHQ-2 Total Score     Trouble falling or staying asleep, or sleeping too much?     Feeling tired or having little energy?     Poor appetite or overeating?     Feeling bad about yourself - or  that you are a failure or have let yourself or your family down?     Trouble concentrating on things, such as reading the newspaper or watching television?     Moving or speaking so slowly that other people could have noticed? Or the opposite - being so fidgety or restless that you have been moving around a lot more than usual?     Thoughts that you would be better off dead, or of hurting yourself in some way?     PHQ-9 Total Score  0   If you checked off any problems, how difficult have these problems made it for you to do your work, take care of things at home, or get along with other people?        Over the last two weeks, how often have you been bothered by the following problems?  Feeling nervous, anxious or on edge: Several days  Not being able to stop or control worrying: More than half the days  Worrying too much about different things: More than half the days  Trouble Relaxing: More than half the days  Being so restless that it is hard to sit still: Several days  Becoming easily annoyed or irritable: More than half the days  Feeling afraid as if something awful might happen: Several days  PAMELLA 7 Total Score: 11  If you checked any problems, how difficult have these problems made it for you to do your work, take care of things at home, or get along with other people: Somewhat difficult      Mental Status Exam:   Hygiene:   good  Cooperation:  Cooperative  Eye Contact:  Good  Psychomotor Behavior:  Appropriate  Affect:  Full range  Mood: anxious  Speech:  Normal  Thought Process:  Goal directed  Thought Content:  Normal  Suicidal:  None  Homicidal: None  Hallucinations:  None  Delusion: None  Memory:  Intact  Orientation:  Grossly Intact  Reliability:  good  Insight:  Good  Judgement:  Good  Impulse Control:  Good  Physical/Medical Issues:  No        Functional Status: Mild impairment     Progress toward goal: Not at goal    Prognosis: Good with continued therapeutic intervention        Plan:    Patient will  continue in individual outpatient therapy with focus on improved functioning and coping skills, maintaining stability, and avoiding decompensation and the need for higher level of care.    Patient will adhere to medication regimen as prescribed and report any side effects. Patient will contact this office, call 911 or present to the nearest emergency room should suicidal or homicidal ideations occur. Provide Cognitive Behavioral Therapy and Solution Focused Therapy to improve functioning, maintain stability, and avoid decompensation and the need for higher level of care.     Return in about 2 weeks (around 1/31/2025).      VISIT DIAGNOSIS:    Diagnosis Plan   1. PAMELLA (generalized anxiety disorder)         08:05 EST       This document has been electronically signed by REZA Martinez  January 20, 2025      Part of this note may be an electronic transcription/translation of spoken language to printed text using the Dragon Dictation System.

## 2025-01-20 NOTE — TREATMENT PLAN
Multi-Disciplinary Problems (from Behavioral Health Treatment Plan)      Active Problems       Problem: Anxiety  Start Date: 01/20/25      Problem Details: The patient self-scales this problem as a 4 with 10 being the worst.          Goal Priority Start Date Expected End Date End Date    Patient will develop and implement behavioral and cognitive strategies to reduce anxiety and irrational fears. Medium 01/20/25 07/21/25 --    Goal Details: Progress toward goal:  Not appropriate to rate progress toward goal since this is the initial treatment plan.          Goal Intervention Frequency Start Date End Date    Help patient explore past emotional issues in relation to present anxiety. Q2 Weeks 01/20/25 --    Intervention Details: Duration of treatment until remission of symptoms.    Pt will identify origins and triggers of anxious responses        Goal Intervention Frequency Start Date End Date    Help patient develop an awareness of their cognitive and physical responses to anxiety. Q2 Weeks 01/20/25 --    Intervention Details: Duration of treatment until remission of symptoms.  Pt will increase understanding of anxious feelings  Pt will decrease PAMELLA-7 score by half  Pt will practice use of learned coping skills daily when not under distress so skills are more easily employed when needed.  Pt will use learned relaxation techniques (controlled breathing, progressive muscle relaxation, mindfulness)  Pt will employ positive and affirming self talk to reduce or eliminate anxiety  Pt will take all medications as prescribed.  Pt will attend counseling regularly                Problem: Ineffective Communication  Start Date: 01/20/25      Problem Details: The patient self-scales this problem as a 4 with 10 being the worst.          Goal Priority Start Date Expected End Date End Date    Patient will demonstrate the ability to initiate new communication patterns outside of sessions on a consistent basis. High 01/20/25 07/21/25  --    Goal Details: Progress toward goal:  Not appropriate to rate progress toward goal since this is the initial treatment plan.    Pt will identify ineffective communication habits and refrain from use  Pt will use subjective statements to communicate his wants and needs to wife and family.          Goal Intervention Frequency Start Date End Date    Encourage understanding of past experiences that affect current communication style and educate about healthy communication patterns. Q2 Weeks 01/20/25 --    Intervention Details: Duration of treatment until remission of symptoms.    Pt will identify maladaptive and ineffective automatic statements and replace with more appropriate and concise statements  Pt will use active listening skills when communicating.                Problem: Vocational Stress  Start Date: 01/20/25      Problem Details: The patient self-scales this problem as a 5 with 10 being the worst.          Goal Priority Start Date Expected End Date End Date    Patient will develop a constructive action plan that will reduce specific areas of work stress. Medium 01/20/25 07/21/25 --    Goal Details: Progress toward goal:  Not appropriate to rate progress toward goal since this is the initial treatment plan.          Goal Intervention Frequency Start Date End Date    Assist patient in identifying emotions and cognitive messages surrounding the work stress. Q2 Weeks 01/20/25 --    Intervention Details: Duration of treatment until remission of symptoms.    Pt will refrain from bringing work stress into the home/family environment        Goal Intervention Frequency Start Date End Date    Reinforce realistic self appraisal of patient's successes and challenges at work. Q2 Weeks 01/20/25 --    Intervention Details: Duration of treatment until remission of symptoms.    Pt will maintain healthy expectations of work load.                               I have discussed and reviewed this treatment plan with the  patient.

## 2025-01-23 ENCOUNTER — OFFICE VISIT (OUTPATIENT)
Dept: INTERNAL MEDICINE | Facility: CLINIC | Age: 55
End: 2025-01-23
Payer: COMMERCIAL

## 2025-01-23 VITALS
DIASTOLIC BLOOD PRESSURE: 84 MMHG | WEIGHT: 256 LBS | HEART RATE: 86 BPM | SYSTOLIC BLOOD PRESSURE: 148 MMHG | OXYGEN SATURATION: 98 % | HEIGHT: 70 IN | BODY MASS INDEX: 36.65 KG/M2

## 2025-01-23 DIAGNOSIS — I10 ESSENTIAL HYPERTENSION: ICD-10-CM

## 2025-01-23 DIAGNOSIS — E66.813 CLASS 3 SEVERE OBESITY DUE TO EXCESS CALORIES WITHOUT SERIOUS COMORBIDITY WITH BODY MASS INDEX (BMI) OF 40.0 TO 44.9 IN ADULT: Primary | ICD-10-CM

## 2025-01-23 DIAGNOSIS — I10 ESSENTIAL HYPERTENSION: Primary | ICD-10-CM

## 2025-01-23 DIAGNOSIS — E66.01 CLASS 3 SEVERE OBESITY DUE TO EXCESS CALORIES WITHOUT SERIOUS COMORBIDITY WITH BODY MASS INDEX (BMI) OF 40.0 TO 44.9 IN ADULT: Primary | ICD-10-CM

## 2025-01-23 DIAGNOSIS — E78.2 MIXED HYPERLIPIDEMIA: ICD-10-CM

## 2025-01-23 DIAGNOSIS — F17.290 CIGAR SMOKER: ICD-10-CM

## 2025-01-23 DIAGNOSIS — Z12.5 SCREENING FOR MALIGNANT NEOPLASM OF PROSTATE: ICD-10-CM

## 2025-01-23 DIAGNOSIS — R09.89 DECREASED BREATH SOUNDS OF BOTH LUNGS: ICD-10-CM

## 2025-01-23 PROCEDURE — 99213 OFFICE O/P EST LOW 20 MIN: CPT | Performed by: NURSE PRACTITIONER

## 2025-01-23 RX ORDER — LISINOPRIL 10 MG/1
10 TABLET ORAL DAILY
Qty: 30 TABLET | Refills: 0 | Status: SHIPPED | OUTPATIENT
Start: 2025-01-23 | End: 2025-01-27 | Stop reason: SDUPTHER

## 2025-01-23 NOTE — PROGRESS NOTES
"Chief Complaint  Hypertension and Med Refill    Subjective        Jayden Stafford presents to [facility]  History of Present Illness  He was last seen in our office on November 21, 2023 for routine physical exam.    Hypertension: He is taking 10 mg lisinopril p.o. daily for many years.  That he was not consistently taking it for around 3 months.  His blood pressure last office visit was 120/90.  Today, his blood pressure is 140/84.  He was encouraged to monitor his blood pressure at home. He does not take his medication daily, continues to smoke cigars. Does not monitor his diet, though does try to incorporate more salad while traveling. No SOA or chest pain.       Objective   Vital Signs:  /84   Pulse 86   Ht 177.8 cm (70\")   Wt 116 kg (256 lb)   SpO2 98%   BMI 36.73 kg/m²   Estimated body mass index is 36.73 kg/m² as calculated from the following:    Height as of this encounter: 177.8 cm (70\").    Weight as of this encounter: 116 kg (256 lb).       Class 2 Severe Obesity (BMI >=35 and <=39.9). Obesity-related health conditions include the following: hypertension. Obesity is worsening. BMI is is above average; BMI management plan is completed. We discussed  not discussed today. Advised patient to make an appointment for routine physical exam where can discuss this in further detail .      Physical Exam  Vitals and nursing note reviewed.   Constitutional:       General: He is not in acute distress.     Appearance: Normal appearance. He is obese. He is not ill-appearing, toxic-appearing or diaphoretic.   Cardiovascular:      Rate and Rhythm: Normal rate and regular rhythm.      Heart sounds: Normal heart sounds.   Pulmonary:      Effort: Pulmonary effort is normal.      Breath sounds: Decreased breath sounds (BLL > BUL) present. No wheezing, rhonchi or rales.   Neurological:      Mental Status: He is alert.        Result Review :                   Assessment and Plan "   Patient is a pleasant 54-year-old male with obesity, essential hypertension, mixed hyperlipidemia, BPPV, intermittent erectile dysfunction he states mostly related to anxiety, cigar smoker here for follow up hypertension.         Diagnoses and all orders for this visit:    1. Essential hypertension (Primary)  Comments:  Recommend staying consistent on medication use.  Discussed increased risk for heart attack and stroke related to uncontrolled blood pressure.  Orders:  -     lisinopril (PRINIVIL,ZESTRIL) 10 MG tablet; Take 1 tablet by mouth Daily.  Dispense: 30 tablet; Refill: 0    2. Cigar smoker  Comments:  Recommend complete cessation of smoking.    3. Decreased breath sounds of both lungs  Comments:  No evidence of pneumonia.  However, if continues will get chest x-ray.  Discuss further next routine physical exam.             Follow Up   Return in about 1 month (around 2/23/2025) for Annual physical.  Patient was given instructions and counseling regarding his condition or for health maintenance advice. Please see specific information pulled into the AVS if appropriate.

## 2025-01-27 ENCOUNTER — TELEPHONE (OUTPATIENT)
Dept: INTERNAL MEDICINE | Facility: CLINIC | Age: 55
End: 2025-01-27
Payer: COMMERCIAL

## 2025-01-27 DIAGNOSIS — I10 ESSENTIAL HYPERTENSION: ICD-10-CM

## 2025-01-27 RX ORDER — LISINOPRIL 10 MG/1
10 TABLET ORAL DAILY
Qty: 30 TABLET | Refills: 0 | Status: SHIPPED | OUTPATIENT
Start: 2025-01-27

## 2025-02-14 ENCOUNTER — OFFICE VISIT (OUTPATIENT)
Dept: INTERNAL MEDICINE | Facility: CLINIC | Age: 55
End: 2025-02-14
Payer: COMMERCIAL

## 2025-02-14 VITALS
DIASTOLIC BLOOD PRESSURE: 82 MMHG | WEIGHT: 255 LBS | HEART RATE: 77 BPM | HEIGHT: 70 IN | SYSTOLIC BLOOD PRESSURE: 132 MMHG | OXYGEN SATURATION: 98 % | BODY MASS INDEX: 36.51 KG/M2

## 2025-02-14 DIAGNOSIS — E78.2 MIXED HYPERLIPIDEMIA: ICD-10-CM

## 2025-02-14 DIAGNOSIS — R09.89 DECREASED BREATH SOUNDS OF BOTH LUNGS: ICD-10-CM

## 2025-02-14 DIAGNOSIS — R73.03 PREDIABETES: ICD-10-CM

## 2025-02-14 DIAGNOSIS — F17.290 CIGAR SMOKER: ICD-10-CM

## 2025-02-14 DIAGNOSIS — E66.9 OBESITY (BMI 30-39.9): ICD-10-CM

## 2025-02-14 DIAGNOSIS — I10 ESSENTIAL HYPERTENSION: ICD-10-CM

## 2025-02-14 DIAGNOSIS — Z00.00 ROUTINE PHYSICAL EXAMINATION: Primary | ICD-10-CM

## 2025-02-14 PROBLEM — E66.813 CLASS 3 SEVERE OBESITY WITHOUT SERIOUS COMORBIDITY WITH BODY MASS INDEX (BMI) OF 40.0 TO 44.9 IN ADULT: Status: RESOLVED | Noted: 2021-12-17 | Resolved: 2025-02-14

## 2025-02-14 PROBLEM — E66.01 CLASS 3 SEVERE OBESITY WITHOUT SERIOUS COMORBIDITY WITH BODY MASS INDEX (BMI) OF 40.0 TO 44.9 IN ADULT: Status: RESOLVED | Noted: 2021-12-17 | Resolved: 2025-02-14

## 2025-02-14 PROCEDURE — 99396 PREV VISIT EST AGE 40-64: CPT | Performed by: NURSE PRACTITIONER

## 2025-02-14 RX ORDER — LISINOPRIL 20 MG/1
20 TABLET ORAL DAILY
Qty: 60 TABLET | Refills: 2 | Status: SHIPPED | OUTPATIENT
Start: 2025-02-14

## 2025-02-14 RX ORDER — ATORVASTATIN CALCIUM 20 MG/1
10 TABLET, FILM COATED ORAL DAILY
Qty: 60 TABLET | Refills: 2 | Status: SHIPPED | OUTPATIENT
Start: 2025-02-14

## 2025-02-14 NOTE — PROGRESS NOTES
"Chief Complaint  Annual Exam    Subjective        Jayden Stafford presents to [facility]  History of Present Illness  To physical exam was November 21, 2023.He was last seen in our office on 01/23/2025 for follow up hypertension.     Hypertension: according to last office note, he reported taking lisinopril 10 mg PO QD for many years, but had not been taking it consistantly for about 3 months. His BP at that time in the office was 140/84 without symptoms.   He did not report making any significant changes to his diet, smokes cigars weekly and drinks about 15 beers per week. Plays golf, sometimes will walk on course. No other exercise reported.     Hyperlipidemia:  Labs were drawn on February 17, 2025, including microscopic examination of the urine, PSA screen, lipid panel, CMP, CBC, and hemoglobin A1c.  Urine showed mild evidence of protein.  LDL cholesterol was too high at 134 and the HDL was too low.  He was recommended that we increase his blood pressure medication start a statin and a low-dose aspirin.  Advised that he was at high risk of developing coronary disease and to also completely stop smoking.  Recommend discuss further at today's office visit.    Prediabetes: hemoglobin A1c=5.7%, slightly improved from last reading 3 years ago when was 5.8%.      Objective   Vital Signs:  /82   Pulse 77   Ht 177.8 cm (70\")   Wt 116 kg (255 lb)   SpO2 98%   BMI 36.59 kg/m²   Estimated body mass index is 36.59 kg/m² as calculated from the following:    Height as of this encounter: 177.8 cm (70\").    Weight as of this encounter: 116 kg (255 lb).               Physical Exam  Vitals and nursing note reviewed.   Constitutional:       General: He is not in acute distress.     Appearance: Normal appearance. He is obese. He is not ill-appearing, toxic-appearing or diaphoretic.   Cardiovascular:      Rate and Rhythm: Normal rate and regular rhythm.      Pulses: Normal pulses.      Heart " sounds: Normal heart sounds.   Pulmonary:      Effort: Pulmonary effort is normal.      Breath sounds: Examination of the right-lower field reveals decreased breath sounds. Examination of the left-lower field reveals decreased breath sounds. Decreased breath sounds (RLL slightly more compromised than LLL) present. No wheezing, rhonchi or rales.   Abdominal:      General: Bowel sounds are normal.      Palpations: Abdomen is soft.      Tenderness: There is no abdominal tenderness.   Musculoskeletal:      Right lower leg: No edema.      Left lower leg: No edema.   Lymphadenopathy:      Head:      Right side of head: No submental, submandibular, tonsillar, preauricular, posterior auricular or occipital adenopathy.      Left side of head: No submental, submandibular, tonsillar, preauricular, posterior auricular or occipital adenopathy.      Cervical: No cervical adenopathy.      Right cervical: No superficial, deep or posterior cervical adenopathy.     Left cervical: No superficial, deep or posterior cervical adenopathy.      Upper Body:      Right upper body: No supraclavicular adenopathy.      Left upper body: No supraclavicular adenopathy.   Neurological:      General: No focal deficit present.      Mental Status: He is alert and oriented to person, place, and time. Mental status is at baseline.      Motor: No weakness.      Gait: Gait normal.   Psychiatric:         Mood and Affect: Mood normal.         Behavior: Behavior normal.         Thought Content: Thought content normal.         Judgment: Judgment normal.        Result Review :                   Assessment and Plan   Patient is a pleasant 54-year-old male with obesity, essential hypertension, mixed hyperlipidemia, BPPV, intermittent erectile dysfunction, cigar smoker here for annual exam.      We discussed preventative care including age and patient-appropriate immunizations and cancer screening. We also discussed the importance of exercise and a healthy diet.  This is their annual preventative exam.        Diagnoses and all orders for this visit:    1. Routine physical examination (Primary)    2. Essential hypertension  -     lisinopril (PRINIVIL,ZESTRIL) 20 MG tablet; Take 1 tablet by mouth Daily.  Dispense: 60 tablet; Refill: 2    3. Mixed hyperlipidemia  Comments:  Start low-dose ASA daily.  Orders:  -     atorvastatin (LIPITOR) 20 MG tablet; Take 0.5 tablets by mouth Daily.  Dispense: 60 tablet; Refill: 2  -     CT Cardiac Calcium Score Without Dye; Future    4. Obesity (BMI 30-39.9)    5. Prediabetes    6. Decreased breath sounds of both lungs    7. Cigar smoker    Jayden Stafford  reports that he has been smoking cigars. He has been exposed to tobacco smoke. He has never used smokeless tobacco. I have educated him on the risk of diseases from using tobacco products such as cancer, COPD, and heart disease.     I advised him to quit and he is not willing to quit.    I spent 3  minutes counseling the patient.           Follow up in 6 months for fasting lipid and reassess response to increased dose of lisinopril and statin with low-dose ASA         Follow Up   Return in about 6 months (around 8/14/2025).  Patient was given instructions and counseling regarding his condition or for health maintenance advice. Please see specific information pulled into the AVS if appropriate.

## 2025-03-28 ENCOUNTER — TELEMEDICINE (OUTPATIENT)
Dept: PSYCHIATRY | Facility: CLINIC | Age: 55
End: 2025-03-28
Payer: COMMERCIAL

## 2025-03-28 ENCOUNTER — HOSPITAL ENCOUNTER (OUTPATIENT)
Dept: CT IMAGING | Facility: HOSPITAL | Age: 55
Discharge: HOME | End: 2025-03-28
Admitting: NURSE PRACTITIONER

## 2025-03-28 DIAGNOSIS — F41.1 GAD (GENERALIZED ANXIETY DISORDER): Primary | ICD-10-CM

## 2025-03-28 DIAGNOSIS — E78.2 MIXED HYPERLIPIDEMIA: ICD-10-CM

## 2025-03-28 PROCEDURE — 75571 CT HRT W/O DYE W/CA TEST: CPT

## 2025-03-28 NOTE — PROGRESS NOTES
Date: March 30, 2025  Time In: 8:07  Time out: 9:02      This provider is located at the [facility]), [address], [place] [address] using a secure [employer] Video Visit through [employer]. Patient is being seen remotely via telehealth, and they are in a secure environment for this session. The patient's condition being diagnosed/treated is appropriate for telemedicine. The provider identified herself as well as her credentials. The patient, and/or patients guardian, consent to be seen remotely, and when consent is given they understand that the consent allows for patient identifiable information to be sent to a third party as needed. They may refuse to be seen remotely at any time. The electronic data is encrypted and password protected, and the patient and/or guardian has been advised of the potential risks to privacy not withstanding such measures.     Mode of Visit: Video  Location of patient: home  Location of provider: Provider home  You have chosen to receive care through a telehealth visit.  The patient has signed the video visit consent form.  The visit included audio and video interaction. No technical issues occurred during this visit    Subjective   Jayden Stafford is a 54 y.o. male who presents today fully oriented, appropriately dressed and groomed, and open to communication for follow up appointment.    Chief Complaint:   Chief Complaint   Patient presents with    Anxiety        History of Present Illness: Rapport was established through conversation and unconditional positive regard. Recent events were discussed and how these events impact Pt's emotional health.   Pt reports successful use of learned coping skills since last report.  Pt reports continuation of symptoms and states the intensity and duration of symptoms has worsened since last report. Pt rates anxiety at 7/10 and depression at 0/10.     Sleep: Pt reports sleep has remained  unchanged since last report. Healthy sleep habits were discussed such as maintaining a schedule, routine, avoiding caffeine, and limiting screen time before bed.    Appetite:  Pt reports appetite has been good since last report.  Discussed the importance of hydration and eating a healthy diet for overall and mental health.    Medication compliance: Pt reports medications are being taken daily as prescribed. Discussed the importance of compliance with prescriber's directions and Pt was instructed to report questions/concerns, as well as missed doses or discontinuation of medication by Pt.     Safety Plan in Place: No Pt denies SI/HI/SIB recent or current    Daily Functioning:  Since last report, Pt states symptoms are causing Moderate difficulty in daily functioning.      Content Discussion:   Pt reports life updates since previous session. Pt identified current stressors. Pt acknowledged stressors within and outside of one's control. Pt identified successes and issues with utilizing coping mechanisms.  Pt saw his PCP for physical, and his medications for blood pressure and cholesterol were adjusted.   Oldest son is getting engaged and he asked his younger brother to be in th wedding.  Pt's youngest son is transgender, and Pt feels this may turn into a big deal.  Pt reports he had a rough week with business travel and it was difficult for him to work out logistics getting home due to flight interruptions.  Pt states he and s/o are doing ok.  Reports multiple stressors are contributing to his stress, along with the medication changes and health concerns.   Reviewed relaxation and coping strategies to use to mitigate symptoms.         Counselor supported Pt in continued exploration of underlying belief systems which contribute to difficulty in connecting/vulnerability.  Through discussion, Pt identifies themes of preservation and overt emotional and physical reactivity when physical and/or emotional statis is in  question, even in low or perceived threatening situations. Counselor supported Pt in identifying past experiences and underlying beliefs which contribute to these feelings and reactions.  Pt was supported and encouraged in processing emotions related to frustrations with the expectations of self and others.  Pt was encouraged to identify cultural and nuclear familial contexts which contribute to these concerns.  Pt was receptive and engaged in conversation, and Pt reports this was beneficial and applicable to their situation.      Reviewed coping skills and encouraged Pt to continue to practicing coping skills daily when not under distress. Pt was praised for their attempts to decrease symptoms and mitigate activating events.    Clinical Maneuvering/Intervention:  CBT was utilized to encourage Pt to identify maladaptive thoughts and behaviors and replace with more affirming and positive.Pt encouraged to set and maintain appropriate and healthy boundaries with others. Pt was instructed to practice daily using appropriate and specific words to communicate feelings to others.  Motivational interviewing used to encourage Pt to identify strengths which can be utilized in working toward treatment goals. Pt encouraged to practice daily learned skills such as controlled breathing, grounding, and mindfulness. Pt was encouraged to ask for help from support persons to assist them in maintaining stability and alleviate symptoms. Discussed the importance of being one's own mental health advocate and to refrain from seeing the need to ask for help as a weakness. Pt was encouraged to formulate a plan of action to be more proactive in managing stressors and refrain from using reactive and automatic heightened emotional responses.     Solution-focused therapy employed to identify how Pt would like their life to be if they were to make positive changes. Pt encouraged to identify effective coping skills and strengths they can use to  continue utilizing those skills. Pt encouraged to discontinue utilizing non-effective coping mechanisms. Pt provided with feedback to highlight achievements and personal and other resources. Encouraged use of SMART goals    Assisted patient in processing above session content; acknowledged and normalized patient’s thoughts, feelings, and concerns.  Rationalized patient thought process regarding concerns presented at session.  Discussed triggers associated with patient's  anxiety  and depression  Also discussed coping skills for patient to implement such as grounding , self care , and positive self talk     Allowed patient to freely discuss issues without interruption or judgment. Provided safe, confidential environment to facilitate the development of positive therapeutic relationship and encourage open, honest communication. Assisted patient in identifying risk factors which would indicate the need for higher level of care including thoughts to harm self or others and/or self-harming behavior and encouraged patient to contact this office, call 911, or present to the nearest emergency room should any of these events occur. Discussed crisis intervention services and means to access. Patient adamantly and convincingly denies current suicidal or homicidal ideation or perceptual disturbance.    Assessment:     Patient appears to maintain relative stability as compared to their baseline.  However, patient persistently struggles with symptoms which continue to cause impairment in important areas of functioning.  As a result, they can be reasonably expected to continue to benefit from treatment and would likely be at increased risk for decompensation otherwise.        Mental Status Exam:   Hygiene:   good  Cooperation:  Cooperative  Eye Contact:  Good  Psychomotor Behavior:  Appropriate  Affect:  Full range  Mood: anxious  Speech:  Normal  Thought Process:  Goal directed  Thought Content:  Normal  Suicidal:   None  Homicidal: None  Hallucinations:  None  Delusion: None  Memory:  Intact  Orientation:  Grossly Intact  Reliability:  good  Insight:  Good  Judgement:  Good  Impulse Control:  Good  Physical/Medical Issues:  No        Functional Status: Mild impairment     Progress toward goal: Not at goal    Prognosis: Good with continued therapeutic intervention        Plan:    Patient will continue in individual outpatient therapy with focus on improved functioning and coping skills, maintaining stability, and avoiding decompensation and the need for higher level of care.    Patient will adhere to medication regimen as prescribed and report any side effects. Patient will contact this office, call 911 or present to the nearest emergency room should suicidal or homicidal ideations occur. Provide Cognitive Behavioral Therapy and Solution Focused Therapy to improve functioning, maintain stability, and avoid decompensation and the need for higher level of care.     Return in about 3 weeks (around 4/18/2025).      VISIT DIAGNOSIS:    Diagnosis Plan   1. PAMELLA (generalized anxiety disorder)         08:07 EDT       This document has been electronically signed by REZA Martinez  March 30, 2025      Part of this note may be an electronic transcription/translation of spoken language to printed text using the Dragon Dictation System.

## 2025-03-31 ENCOUNTER — RESULTS FOLLOW-UP (OUTPATIENT)
Dept: INTERNAL MEDICINE | Facility: CLINIC | Age: 55
End: 2025-03-31
Payer: COMMERCIAL

## 2025-04-11 ENCOUNTER — TELEMEDICINE (OUTPATIENT)
Dept: PSYCHIATRY | Facility: CLINIC | Age: 55
End: 2025-04-11
Payer: COMMERCIAL

## 2025-04-11 DIAGNOSIS — F41.1 GAD (GENERALIZED ANXIETY DISORDER): Primary | ICD-10-CM
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
## 2025-05-09 ENCOUNTER — TELEMEDICINE (OUTPATIENT)
Dept: PSYCHIATRY | Facility: CLINIC | Age: 55
End: 2025-05-09
Payer: COMMERCIAL

## 2025-05-09 DIAGNOSIS — F41.1 GAD (GENERALIZED ANXIETY DISORDER): Primary | ICD-10-CM

## 2025-05-09 DIAGNOSIS — F33.0 MDD (MAJOR DEPRESSIVE DISORDER), RECURRENT EPISODE, MILD: ICD-10-CM

## 2025-05-09 NOTE — TREATMENT PLAN
Multi-Disciplinary Problems (from Behavioral Health Treatment Plan)      Active Problems       Problem: Anxiety  Start Date: 05/09/25      Problem Details: The patient self-scales this problem as a 4 with 10 being the worst.          Goal Priority Start Date Expected End Date End Date    Patient will develop and implement behavioral and cognitive strategies to reduce anxiety and irrational fears. High 05/09/25 11/07/25 --    Goal Details: Progress toward goal:  Not appropriate to rate progress toward goal since this is the initial treatment plan.        Goal Intervention Frequency Start Date End Date    Help patient explore past emotional issues in relation to present anxiety. Q2 Weeks 05/09/25 --    Intervention Details: Duration of treatment until remission of symptoms.      Help patient explore past emotional issues in relation to present anxiety.     Pt will increase understanding of anxious feelings by processing origin of anxious feelings  Pt will employ positive and affirming self talk to reduce or eliminate anxiety  Pt will take all medications as prescribed.  Pt will attend counseling regularly  Pt will decrease PAMELLA-7 score by half        Goal Intervention Frequency Start Date End Date    Help patient develop an awareness of their cognitive and physical responses to anxiety. Q2 Weeks 05/09/25 --    Intervention Details: Duration of treatment until remission of symptoms.      Help patient develop an awareness of their cognitive and physical responses to anxiety.  Pt will decrease frequency of panic feelings by half  Pt will practice use of learned coping skills daily when not under distress so skills are more easily employed when needed.  Pt will use learned relaxation techniques (controlled breathing, progressive muscle relaxation, mindfulness)  DBT and CBT will be utilized to assist Pt in recognizing heightened emotional responses to stress, and using a multi-faceted approach, Pt will identify ways to  mitigate associated symptoms.                Problem: Depression  Start Date: 05/09/25      Problem Details: The patient self-scales this problem as a 4 with 10 being the worst.          Goal Priority Start Date Expected End Date End Date    Patient will demonstrate the ability to initiate new constructive life skills outside of sessions on a consistent basis. Medium 05/09/25 11/07/25 --    Goal Details: Progress toward goal:  Not appropriate to rate progress toward goal since this is the initial treatment plan.        Goal Intervention Frequency Start Date End Date    Assist patient in setting attainable activities of daily living goals. Q2 Weeks 05/09/25 --    Intervention Details: Assist patient in setting attainable activities of daily living goals.    Increase understanding of depressive feelings  Pt will decrease PHQ-9 score by half  Address underlying issues that may be contributing to Pt's depression  Set boundaries as needed  Take all medications as prescribed  Actively participate in counseling  Comply with all contracts for safety         Goal Intervention Frequency Start Date End Date    Provide education about depression Q2 Weeks 05/09/25 --    Intervention Details: Duration of treatment until remission of symptoms.      Provide education about depression     Counselor will provide detailed explanation of how depression can affect one's emotional and physical health.  Pt will ask questions as need to gain an more in-depth understanding of their depression  Pt will inform Counselor if there is a need for a higher level of care up to and including hospitalization.          Goal Intervention Frequency Start Date End Date    Assist patient in developing healthy coping strategies. Weekly 05/09/25 --    Intervention Details: Duration of treatment until remission of symptomsCounselor will work with Pt to develop a set of coping skills that Pt may access outside of Counseling  Pt will practice coping skills  daily when not under distress so these skills are more readily recalled when needed.  Pt will replace negative self defeating self talk with more positive life affirming statements to improve mood.                        Reviewed By       Emerald Moffett, [facility] 05/09/25 [id number]                     I have discussed and reviewed this treatment plan with the patient.

## 2025-05-09 NOTE — PROGRESS NOTES
Date: May 9, 2025  Time In: 8:03  Time out: 8:47      This provider is located at the [facility]through [facility]), [address], [place] [address] using a secure [employer] Video Visit through [employer]. Patient is being seen remotely via telehealth, and they are in a secure environment for this session. The patient's condition being diagnosed/treated is appropriate for telemedicine. The provider identified herself as well as her credentials. The patient, and/or patients guardian, consent to be seen remotely, and when consent is given they understand that the consent allows for patient identifiable information to be sent to a third party as needed. They may refuse to be seen remotely at any time. The electronic data is encrypted and password protected, and the patient and/or guardian has been advised of the potential risks to privacy not withstanding such measures.     Mode of Visit: Video  Location of patient: Home  Location of provider: Provider home  You have chosen to receive care through a telehealth visit.  The patient has signed the video visit consent form.  The visit included audio and video interaction. No technical issues occurred during this visit    Subjective   Jayden Stafford is a 54 y.o. male who presents today fully oriented, appropriately dressed and groomed, and open to communication for follow up appointment.    Chief Complaint:   Chief Complaint   Patient presents with    Anxiety    Depression        History of Present Illness: Rapport was established through conversation and unconditional positive regard. Recent events were discussed and how these events impact Pt's emotional health.   Pt reports successful use of learned coping skills since last report.  Pt reports continuation of symptoms and states the intensity and duration of symptoms has remained unchanged since last report. Pt rates anxiety at 3/10 and depression at 3/10.     Sleep: Pt reports  sleep has remained unchanged since last report. Healthy sleep habits were discussed such as maintaining a schedule, routine, avoiding caffeine, and limiting screen time before bed.    Appetite:  Pt reports appetite has been good since last report.  Discussed the importance of hydration and eating a healthy diet for overall and mental health.    Medication compliance: Pt reports medications are being taken daily as prescribed. Discussed the importance of compliance with prescriber's directions and Pt was instructed to report questions/concerns, as well as missed doses or discontinuation of medication by Pt.     Safety Plan in Place: No Pt denies SI/HI/SIB recent or current    Daily Functioning:  Since last report, Pt states symptoms are causing Mild difficulty in daily functioning.      Content Discussion:   Pt reports life updates since previous session. Pt identified current stressors. Pt acknowledged stressors within and outside of one's control. Pt identified successes and issues with utilizing coping mechanisms.  Pt states overall things have been going pretty good.  Last weekend went out of town to son's college graduation. Pt's son is trans and they wore a dress and heels and Pt states he is fine with the trans status, but he wishes it did not call attention from others. Pt states they went to a get-together after graduation, and Pt states he and wife felt somewhat uncomfortable that others were noticing son's feminine appearance.  Pt was allowed to process some feelings about this.  Pt also processed some feelings associated with parenting of his sons.  Pt states son's recent graduation brought up some feelings about his older son's graduation. Pt states overall his is doing well with his anxiety and depression, and work is going good.      Counselor supported Pt in continued exploration of underlying belief systems which contribute to difficulty in connecting/vulnerability.  Through discussion, Pt identifies  themes of preservation and overt emotional and physical reactivity when physical and/or emotional statis is in question, even in low or perceived threatening situations. Counselor supported Pt in identifying past experiences and underlying beliefs which contribute to these feelings and reactions.  Pt was supported and encouraged in processing emotions related to frustrations with the expectations of self and others.  Pt was encouraged to identify cultural and nuclear familial contexts which contribute to these concerns.  Pt was receptive and engaged in conversation, and Pt reports this was beneficial and applicable to their situation.      Reviewed coping skills and encouraged Pt to continue to practicing coping skills daily when not under distress. Pt was praised for their attempts to decrease symptoms and mitigate activating events.    Clinical Maneuvering/Intervention:  CBT was utilized to encourage Pt to identify maladaptive thoughts and behaviors and replace with more affirming and positive.Pt encouraged to set and maintain appropriate and healthy boundaries with others. Pt was instructed to practice daily using appropriate and specific words to communicate feelings to others.  Motivational interviewing used to encourage Pt to identify strengths which can be utilized in working toward treatment goals. Pt encouraged to practice daily learned skills such as controlled breathing, grounding, and mindfulness. Pt was encouraged to ask for help from support persons to assist them in maintaining stability and alleviate symptoms. Discussed the importance of being one's own mental health advocate and to refrain from seeing the need to ask for help as a weakness. Pt was encouraged to formulate a plan of action to be more proactive in managing stressors and refrain from using reactive and automatic heightened emotional responses.     Solution-focused therapy employed to identify how Pt would like their life to be if they  were to make positive changes. Pt encouraged to identify effective coping skills and strengths they can use to continue utilizing those skills. Pt encouraged to discontinue utilizing non-effective coping mechanisms. Pt provided with feedback to highlight achievements and personal and other resources. Encouraged use of SMART goals    Assisted patient in processing above session content; acknowledged and normalized patient’s thoughts, feelings, and concerns.  Rationalized patient thought process regarding concerns presented at session.  Discussed triggers associated with patient's  anxiety  and depression  Also discussed coping skills for patient to implement such as increasing activity , self care , and positive self talk     Allowed patient to freely discuss issues without interruption or judgment. Provided safe, confidential environment to facilitate the development of positive therapeutic relationship and encourage open, honest communication. Assisted patient in identifying risk factors which would indicate the need for higher level of care including thoughts to harm self or others and/or self-harming behavior and encouraged patient to contact this office, call 911, or present to the nearest emergency room should any of these events occur. Discussed crisis intervention services and means to access. Patient adamantly and convincingly denies current suicidal or homicidal ideation or perceptual disturbance.    Assessment:     Patient appears to maintain relative stability as compared to their baseline.  However, patient persistently struggles with symptoms which continue to cause impairment in important areas of functioning.  As a result, they can be reasonably expected to continue to benefit from treatment and would likely be at increased risk for decompensation otherwise.        Mental Status Exam:   Hygiene:   good  Cooperation:  Cooperative  Eye Contact:  Good  Psychomotor Behavior:  Appropriate  Affect:  Full  range  Mood: normal  Speech:  Normal  Thought Process:  Goal directed  Thought Content:  Normal  Suicidal:  None  Homicidal: None  Hallucinations:  None  Delusion: None  Memory:  Intact  Orientation:  Grossly Intact  Reliability:  good  Insight:  Good  Judgement:  Good  Impulse Control:  Good  Physical/Medical Issues:  No        Functional Status: Mild impairment     Progress toward goal: Not at goal    Prognosis: Good with continued therapeutic intervention        Plan:    Patient will continue in individual outpatient therapy with focus on improved functioning and coping skills, maintaining stability, and avoiding decompensation and the need for higher level of care.    Patient will adhere to medication regimen as prescribed and report any side effects. Patient will contact this office, call 911 or present to the nearest emergency room should suicidal or homicidal ideations occur. Provide Cognitive Behavioral Therapy and Solution Focused Therapy to improve functioning, maintain stability, and avoid decompensation and the need for higher level of care.     Return in about 2 weeks (around 5/23/2025).      VISIT DIAGNOSIS:    Diagnosis Plan   1. PAMELLA (generalized anxiety disorder)        2. MDD (major depressive disorder), recurrent episode, mild         08:03 EDT       This document has been electronically signed by REZA Martinez  May 9, 2025      Part of this note may be an electronic transcription/translation of spoken language to printed text using the Dragon Dictation System.

## 2025-06-13 ENCOUNTER — TELEMEDICINE (OUTPATIENT)
Dept: PSYCHIATRY | Facility: CLINIC | Age: 55
End: 2025-06-13
Payer: COMMERCIAL

## 2025-06-13 DIAGNOSIS — F33.0 MDD (MAJOR DEPRESSIVE DISORDER), RECURRENT EPISODE, MILD: ICD-10-CM

## 2025-06-13 DIAGNOSIS — F41.1 GAD (GENERALIZED ANXIETY DISORDER): Primary | ICD-10-CM

## 2025-06-13 NOTE — PROGRESS NOTES
Date: June 13, 2025  Time In: 8:06  Time out: 9:01      This provider is located at the [facility]), [address], [place] [address] using a secure "[employer] Video Visit through [employer]. Patient is being seen remotely via telehealth, and they are in a secure environment for this session. The patient's condition being diagnosed/treated is appropriate for telemedicine. The provider identified herself as well as her credentials. The patient, and/or patients guardian, consent to be seen remotely, and when consent is given they understand that the consent allows for patient identifiable information to be sent to a third party as needed. They may refuse to be seen remotely at any time. The electronic data is encrypted and password protected, and the patient and/or guardian has been advised of the potential risks to privacy not withstanding such measures.     Mode of Visit: Video  Location of patient: Home  Location of provider: Provider home  You have chosen to receive care through a telehealth visit.  The patient has signed the video visit consent form.  The visit included audio and video interaction. No technical issues occurred during this visit    Subjective   Jayden Stafford is a 54 y.o. male who presents today fully oriented, appropriately dressed and groomed, and open to communication for follow up appointment.    Chief Complaint:   Chief Complaint   Patient presents with    Anxiety    Depression        History of Present Illness: Rapport was established through conversation and unconditional positive regard. Recent events were discussed and how these events impact Pt's emotional health.   Pt reports successful use of learned coping skills since last report.  Pt reports continuation of symptoms and states the intensity and duration of symptoms has remained unchanged since last report. Pt rates anxiety at 5/10 and depression at 2/10.     Sleep: Pt  "reports sleep has remained unchanged since last report. Healthy sleep habits were discussed such as maintaining a schedule, routine, avoiding caffeine, and limiting screen time before bed.    Appetite:  Pt reports appetite has been good since last report.  Discussed the importance of hydration and eating a healthy diet for overall and mental health.    Medication compliance: Pt reports medications are being taken daily as prescribed. Discussed the importance of compliance with prescriber's directions and Pt was instructed to report questions/concerns, as well as missed doses or discontinuation of medication by Pt.     Safety Plan in Place: No Pt denies SI/HI/SIB recent or current    Daily Functioning:  Since last report, Pt states symptoms are causing Mild difficulty in daily functioning.      Content Discussion:   Pt reports life updates since previous session. Pt identified current stressors. Pt acknowledged stressors within and outside of one's control. Pt identified successes and issues with utilizing coping mechanisms.  Pt states he has been drinking a \"concoction\" in the mornings his wife makes with hot water, salt, and lemon juice, and Pt states he does feel he feels better and sleep is better.  States he may have had a couple of nights where he has slept a little better.  States he has been dreaming a little more and feels he is getting deeper sleep.  Pt states he and wife went to [place] to look at some places for their son's wedding rehearsal dinner.  Pt reports he is having some mild anxiety over reconciling the cost and providing a pleasant event for his son and daughter in law.  Pt states he and wife are going to a friends' cabin in July and they are looking forward to this.  Pt states he and trans son went a week out of town with Pt's work and they had some discussions, and Pt felt his was good for their relationship.       Counselor supported Pt in continued exploration of underlying belief systems " which contribute to difficulty in connecting/vulnerability.  Through discussion, Pt identifies themes of preservation and overt emotional and physical reactivity when physical and/or emotional statis is in question, even in low or perceived threatening situations. Counselor supported Pt in identifying past experiences and underlying beliefs which contribute to these feelings and reactions.  Pt was supported and encouraged in processing emotions related to frustrations with the expectations of self and others.  Pt was encouraged to identify cultural and nuclear familial contexts which contribute to these concerns.  Pt was receptive and engaged in conversation, and Pt reports this was beneficial and applicable to their situation.      Reviewed coping skills and encouraged Pt to continue to practicing coping skills daily when not under distress. Pt was praised for their attempts to decrease symptoms and mitigate activating events.    Clinical Maneuvering/Intervention:  CBT was utilized to encourage Pt to identify maladaptive thoughts and behaviors and replace with more affirming and positive.Pt encouraged to set and maintain appropriate and healthy boundaries with others. Pt was instructed to practice daily using appropriate and specific words to communicate feelings to others.  Motivational interviewing used to encourage Pt to identify strengths which can be utilized in working toward treatment goals. Pt encouraged to practice daily learned skills such as controlled breathing, grounding, and mindfulness. Pt was encouraged to ask for help from support persons to assist them in maintaining stability and alleviate symptoms. Discussed the importance of being one's own mental health advocate and to refrain from seeing the need to ask for help as a weakness. Pt was encouraged to formulate a plan of action to be more proactive in managing stressors and refrain from using reactive and automatic heightened emotional responses.      Solution-focused therapy employed to identify how Pt would like their life to be if they were to make positive changes. Pt encouraged to identify effective coping skills and strengths they can use to continue utilizing those skills. Pt encouraged to discontinue utilizing non-effective coping mechanisms. Pt provided with feedback to highlight achievements and personal and other resources. Encouraged use of SMART goals    Assisted patient in processing above session content; acknowledged and normalized patient’s thoughts, feelings, and concerns.  Rationalized patient thought process regarding concerns presented at session.  Discussed triggers associated with patient's  anxiety  Also discussed coping skills for patient to implement such as mindfulness , self care , and positive self talk     Allowed patient to freely discuss issues without interruption or judgment. Provided safe, confidential environment to facilitate the development of positive therapeutic relationship and encourage open, honest communication. Assisted patient in identifying risk factors which would indicate the need for higher level of care including thoughts to harm self or others and/or self-harming behavior and encouraged patient to contact this office, call 911, or present to the nearest emergency room should any of these events occur. Discussed crisis intervention services and means to access. Patient adamantly and convincingly denies current suicidal or homicidal ideation or perceptual disturbance.    Assessment:     Patient appears to maintain relative stability as compared to their baseline.  However, patient persistently struggles with symptoms which continue to cause impairment in important areas of functioning.  As a result, they can be reasonably expected to continue to benefit from treatment and would likely be at increased risk for decompensation otherwise.      PHQ-Score Total:  PHQ-9 Total Score: PHQ-9 Depression Screening  Little  interest or pleasure in doing things?  0   Feeling down, depressed, or hopeless?  0   PHQ-2 Total Score     Trouble falling or staying asleep, or sleeping too much?     Feeling tired or having little energy?     Poor appetite or overeating?     Feeling bad about yourself - or that you are a failure or have let yourself or your family down?     Trouble concentrating on things, such as reading the newspaper or watching television?     Moving or speaking so slowly that other people could have noticed? Or the opposite - being so fidgety or restless that you have been moving around a lot more than usual?     Thoughts that you would be better off dead, or of hurting yourself in some way?     PHQ-9 Total Score     If you checked off any problems, how difficult have these problems made it for you to do your work, take care of things at home, or get along with other people?               Mental Status Exam:   Hygiene:   good  Cooperation:  Cooperative  Eye Contact:  Good  Psychomotor Behavior:  Appropriate  Affect:  Full range  Mood: anxious  Speech:  Normal  Thought Process:  Goal directed  Thought Content:  Normal  Suicidal:  None  Homicidal: None  Hallucinations:  None  Delusion: None  Memory:  Intact  Orientation:  Grossly Intact  Reliability:  good  Insight:  Good  Judgement:  Good  Impulse Control:  Good  Physical/Medical Issues:  No        Functional Status: Mild impairment     Progress toward goal: Not at goal    Prognosis: Good with continued therapeutic intervention        Plan:    Patient will continue in individual outpatient therapy with focus on improved functioning and coping skills, maintaining stability, and avoiding decompensation and the need for higher level of care.    Patient will adhere to medication regimen as prescribed and report any side effects. Patient will contact this office, call 911 or present to the nearest emergency room should suicidal or homicidal ideations occur. Provide Cognitive  Behavioral Therapy and Solution Focused Therapy to improve functioning, maintain stability, and avoid decompensation and the need for higher level of care.     Return in about 2 weeks (around 6/27/2025).      VISIT DIAGNOSIS:    Diagnosis Plan   1. PAMELLA (generalized anxiety disorder)        2. MDD (major depressive disorder), recurrent episode, mild         08:06 EDT       This document has been electronically signed by REZA Martinez  June 13, 2025      Part of this note may be an electronic transcription/translation of spoken language to printed text using the Dragon Dictation System.

## 2025-07-14 DIAGNOSIS — I10 ESSENTIAL HYPERTENSION: ICD-10-CM

## 2025-07-14 DIAGNOSIS — E78.2 MIXED HYPERLIPIDEMIA: ICD-10-CM

## 2025-07-14 RX ORDER — ATORVASTATIN CALCIUM 20 MG/1
10 TABLET, FILM COATED ORAL DAILY
Qty: 60 TABLET | Refills: 1 | Status: SHIPPED | OUTPATIENT
Start: 2025-07-14

## 2025-07-14 RX ORDER — LISINOPRIL 20 MG/1
20 TABLET ORAL DAILY
Qty: 60 TABLET | Refills: 1 | Status: SHIPPED | OUTPATIENT
Start: 2025-07-14 | End: 2025-07-15

## 2025-07-15 RX ORDER — LISINOPRIL 10 MG/1
10 TABLET ORAL DAILY
Qty: 30 TABLET | Refills: 0 | Status: SHIPPED | OUTPATIENT
Start: 2025-07-15

## 2025-07-15 NOTE — TELEPHONE ENCOUNTER
This was sent yesterday as a 1/2 tab daily, pt states should be a pill daily but it shows it was D/C'd.  What do you want to do about refill.  What should he be on?

## 2025-07-25 ENCOUNTER — TELEMEDICINE (OUTPATIENT)
Dept: PSYCHIATRY | Facility: CLINIC | Age: 55
End: 2025-07-25
Payer: COMMERCIAL

## 2025-07-25 DIAGNOSIS — F41.1 GAD (GENERALIZED ANXIETY DISORDER): Primary | ICD-10-CM

## 2025-07-25 DIAGNOSIS — F33.0 MDD (MAJOR DEPRESSIVE DISORDER), RECURRENT EPISODE, MILD: ICD-10-CM

## 2025-07-25 DIAGNOSIS — F10.90 ALCOHOL USE DISORDER: ICD-10-CM

## 2025-07-25 NOTE — PROGRESS NOTES
Date: July 27, 2025  Time In: 9:11  Time out: 10:08      This provider is located at the [facility] [address], [place] [address] using a secure [employer] Video Visit through [email or web address]. Patient is being seen remotely via telehealth, and they are in a secure environment for this session. The patient's condition being diagnosed/treated is appropriate for telemedicine. The provider identified herself as well as her credentials. The patient, and/or patients guardian, consent to be seen remotely, and when consent is given they understand that the consent allows for patient identifiable information to be sent to a third party as needed. They may refuse to be seen remotely at any time. The electronic data is encrypted and password protected, and the patient and/or guardian has been advised of the potential risks to privacy not withstanding such measures.     Mode of Visit: Video  Location of patient: home  Location of provider: Provider home  You have chosen to receive care through a telehealth visit.  The patient has signed the video visit consent form.  The visit included audio and video interaction. No technical issues occurred during this visit    Subjective   Jayden Stafford is a 54 y.o. male who presents today fully oriented, appropriately dressed and groomed, and open to communication for follow up appointment.    Chief Complaint:   Chief Complaint   Patient presents with    Anxiety    Alcohol Problem    Depression        History of Present Illness: Rapport was established through conversation and unconditional positive regard. Recent events were discussed and how these events impact Pt's emotional health.   Pt reports successful use of learned coping skills since last report.  Pt reports continuation of symptoms and states the intensity and duration of symptoms has worsened since last report. Pt rates anxiety at 7/10 and depression at 3/10.  "    Sleep: Pt reports sleep has remained unchanged since last report. Healthy sleep habits were discussed such as maintaining a schedule, routine, avoiding caffeine, and limiting screen time before bed.    Appetite:  Pt reports appetite has been good since last report.  Discussed the importance of hydration and eating a healthy diet for overall and mental health.    Medication compliance: Pt reports medications are being taken daily as prescribed. Discussed the importance of compliance with prescriber's directions and Pt was instructed to report questions/concerns, as well as missed doses or discontinuation of medication by Pt.     Safety Plan in Place: No Pt denies SI/HI/SIB recent or current    Daily Functioning:  Since last report, Pt states symptoms are causing Mild difficulty in daily functioning.      Content Discussion:   Pt reports life updates since previous session. Pt identified current stressors. Pt acknowledged stressors within and outside of one's control. Pt identified successes and issues with utilizing coping mechanisms.  Pt states he has been frustrated \"that's what I feel the most\" right now.\"   Pt states he has \"kind of neglected\" his dental health and needs to get some work done. Pt states he is maxed out on benefits and this is a stressor because he cannot wait to get the work done and it is costly.  Pt is also frustrated with his son's wedding, specifically how son and his fiance are communicating with Pt and his wife. Pt admits he has been drinking more lately.  Pt states he has an understanding of the need to stop alcohol use.  Pt offered referral for intervention and Pt declined.  Pt reports he attributes the increase in use to the stress of his son's upcoming wedding and wife and son's fiance's strained relationship.  Pt reports he also has some additional stressors at work which are contributing to Pt's anxiety.  Pt and Counselor reviewed coping skills Pt can utilized to replace unhealthy " coming mechanisms.      Counselor supported Pt in continued exploration of underlying belief systems which contribute to difficulty in connecting/vulnerability.  Through discussion, Pt identifies themes of preservation and overt emotional and physical reactivity when physical and/or emotional statis is in question, even in low or perceived threatening situations. Counselor supported Pt in identifying past experiences and underlying beliefs which contribute to these feelings and reactions.  Pt was supported and encouraged in processing emotions related to frustrations with the expectations of self and others.  Pt was encouraged to identify cultural and nuclear familial contexts which contribute to these concerns.  Pt was receptive and engaged in conversation, and Pt reports this was beneficial and applicable to their situation.      Reviewed coping skills and encouraged Pt to continue to practicing coping skills daily when not under distress. Pt was praised for their attempts to decrease symptoms and mitigate activating events.    Clinical Maneuvering/Intervention:  CBT was utilized to encourage Pt to identify maladaptive thoughts and behaviors and replace with more affirming and positive.Pt encouraged to set and maintain appropriate and healthy boundaries with others. Pt was instructed to practice daily using appropriate and specific words to communicate feelings to others.  Motivational interviewing used to encourage Pt to identify strengths which can be utilized in working toward treatment goals. Pt encouraged to practice daily learned skills such as controlled breathing, grounding, and mindfulness. Pt was encouraged to ask for help from support persons to assist them in maintaining stability and alleviate symptoms. Discussed the importance of being one's own mental health advocate and to refrain from seeing the need to ask for help as a weakness. Pt was encouraged to formulate a plan of action to be more  proactive in managing stressors and refrain from using reactive and automatic heightened emotional responses.     Solution-focused therapy employed to identify how Pt would like their life to be if they were to make positive changes. Pt encouraged to identify effective coping skills and strengths they can use to continue utilizing those skills. Pt encouraged to discontinue utilizing non-effective coping mechanisms. Pt provided with feedback to highlight achievements and personal and other resources. Encouraged use of SMART goals    Assisted patient in processing above session content; acknowledged and normalized patient’s thoughts, feelings, and concerns.  Rationalized patient thought process regarding concerns presented at session.  Discussed triggers associated with patient's  anxiety , depression , and alcohol use. Also discussed coping skills for patient to implement such as grounding , self care , positive self talk , and replacing negative coping mechanisms with positive and affirming behaviors and self talk.    Allowed patient to freely discuss issues without interruption or judgment. Provided safe, confidential environment to facilitate the development of positive therapeutic relationship and encourage open, honest communication. Assisted patient in identifying risk factors which would indicate the need for higher level of care including thoughts to harm self or others and/or self-harming behavior and encouraged patient to contact this office, call 911, or present to the nearest emergency room should any of these events occur. Discussed crisis intervention services and means to access. Patient adamantly and convincingly denies current suicidal or homicidal ideation or perceptual disturbance.    Assessment:     Patient appears to maintain relative stability as compared to their baseline.  However, patient persistently struggles with symptoms which continue to cause impairment in important areas of  functioning.  As a result, they can be reasonably expected to continue to benefit from treatment and would likely be at increased risk for decompensation otherwise.    Mental Status Exam:   Hygiene:   good  Cooperation:  Cooperative  Eye Contact:  Good  Psychomotor Behavior:  Appropriate  Affect:  Full range  Mood: depressed and anxious  Speech:  Normal  Thought Process:  Goal directed  Thought Content:  Normal  Suicidal:  None  Homicidal: None  Hallucinations:  None  Delusion: None  Memory:  Intact  Orientation:  Grossly Intact  Reliability:  good  Insight:  Fair  Judgement:  Good  Impulse Control:  Fair  Physical/Medical Issues:  dental issues       Functional Status: Mild impairment     Progress toward goal: Not at goal    Prognosis: Good with continued therapeutic intervention        Plan:    Patient will continue in individual outpatient therapy with focus on improved functioning and coping skills, maintaining stability, and avoiding decompensation and the need for higher level of care.    Patient will adhere to medication regimen as prescribed and report any side effects. Patient will contact this office, call 911 or present to the nearest emergency room should suicidal or homicidal ideations occur. Provide Cognitive Behavioral Therapy and Solution Focused Therapy to improve functioning, maintain stability, and avoid decompensation and the need for higher level of care.     Return in about 2 weeks (around 8/8/2025).      VISIT DIAGNOSIS:    Diagnosis Plan   1. PAMELLA (generalized anxiety disorder)        2. MDD (major depressive disorder), recurrent episode, mild        3. Alcohol use disorder         09:11 EDT       This document has been electronically signed by REZA Martinez  July 27, 2025      Part of this note may be an electronic transcription/translation of spoken language to printed text using the Dragon Dictation System.   No

## 2025-08-08 ENCOUNTER — TELEPHONE (OUTPATIENT)
Dept: INTERNAL MEDICINE | Facility: CLINIC | Age: 55
End: 2025-08-08
Payer: COMMERCIAL

## 2025-08-08 DIAGNOSIS — E78.2 MIXED HYPERLIPIDEMIA: Primary | ICD-10-CM

## 2025-08-08 DIAGNOSIS — I10 ESSENTIAL HYPERTENSION: ICD-10-CM

## 2025-08-08 DIAGNOSIS — R73.03 PREDIABETES: ICD-10-CM

## 2025-08-08 DIAGNOSIS — E66.9 OBESITY (BMI 30-39.9): ICD-10-CM

## 2025-08-08 DIAGNOSIS — E55.9 VITAMIN D DEFICIENCY: ICD-10-CM

## 2025-08-09 LAB
25(OH)D3+25(OH)D2 SERPL-MCNC: 25.8 NG/ML (ref 30–100)
ALBUMIN SERPL-MCNC: 4.4 G/DL (ref 3.8–4.9)
ALP SERPL-CCNC: 86 IU/L (ref 44–121)
ALT SERPL-CCNC: 37 IU/L (ref 0–44)
APPEARANCE UR: CLEAR
AST SERPL-CCNC: 28 IU/L (ref 0–40)
BACTERIA #/AREA URNS HPF: NORMAL /[HPF]
BASOPHILS # BLD AUTO: 0.1 X10E3/UL (ref 0–0.2)
BASOPHILS NFR BLD AUTO: 1 %
BILIRUB SERPL-MCNC: 0.6 MG/DL (ref 0–1.2)
BILIRUB UR QL STRIP: NEGATIVE
BUN SERPL-MCNC: 13 MG/DL (ref 6–24)
BUN/CREAT SERPL: 11 (ref 9–20)
CALCIUM SERPL-MCNC: 9.9 MG/DL (ref 8.7–10.2)
CASTS URNS QL MICRO: NORMAL /LPF
CHLORIDE SERPL-SCNC: 103 MMOL/L (ref 96–106)
CHOLEST SERPL-MCNC: 170 MG/DL (ref 100–199)
CO2 SERPL-SCNC: 22 MMOL/L (ref 20–29)
COLOR UR: YELLOW
CREAT SERPL-MCNC: 1.17 MG/DL (ref 0.76–1.27)
EGFRCR SERPLBLD CKD-EPI 2021: 73 ML/MIN/1.73
EOSINOPHIL # BLD AUTO: 0.2 X10E3/UL (ref 0–0.4)
EOSINOPHIL NFR BLD AUTO: 3 %
EPI CELLS #/AREA URNS HPF: NORMAL /HPF (ref 0–10)
ERYTHROCYTE [DISTWIDTH] IN BLOOD BY AUTOMATED COUNT: 13.4 % (ref 11.6–15.4)
GLOBULIN SER CALC-MCNC: 2.7 G/DL (ref 1.5–4.5)
GLUCOSE SERPL-MCNC: 89 MG/DL (ref 70–99)
GLUCOSE UR QL STRIP: NEGATIVE
HBA1C MFR BLD: 5.5 % (ref 4.8–5.6)
HCT VFR BLD AUTO: 49.7 % (ref 37.5–51)
HDLC SERPL-MCNC: 35 MG/DL
HGB BLD-MCNC: 16.1 G/DL (ref 13–17.7)
HGB UR QL STRIP: NEGATIVE
IMM GRANULOCYTES # BLD AUTO: 0 X10E3/UL (ref 0–0.1)
IMM GRANULOCYTES NFR BLD AUTO: 1 %
KETONES UR QL STRIP: NEGATIVE
LDLC SERPL CALC-MCNC: 96 MG/DL (ref 0–99)
LEUKOCYTE ESTERASE UR QL STRIP: NEGATIVE
LYMPHOCYTES # BLD AUTO: 1.8 X10E3/UL (ref 0.7–3.1)
LYMPHOCYTES NFR BLD AUTO: 21 %
MCH RBC QN AUTO: 30.8 PG (ref 26.6–33)
MCHC RBC AUTO-ENTMCNC: 32.4 G/DL (ref 31.5–35.7)
MCV RBC AUTO: 95 FL (ref 79–97)
MICRO URNS: NORMAL
MICRO URNS: NORMAL
MONOCYTES # BLD AUTO: 0.9 X10E3/UL (ref 0.1–0.9)
MONOCYTES NFR BLD AUTO: 10 %
NEUTROPHILS # BLD AUTO: 5.6 X10E3/UL (ref 1.4–7)
NEUTROPHILS NFR BLD AUTO: 64 %
NITRITE UR QL STRIP: NEGATIVE
PH UR STRIP: 5.5 [PH] (ref 5–7.5)
PLATELET # BLD AUTO: 291 X10E3/UL (ref 150–450)
POTASSIUM SERPL-SCNC: 5.1 MMOL/L (ref 3.5–5.2)
PROT SERPL-MCNC: 7.1 G/DL (ref 6–8.5)
PROT UR QL STRIP: NEGATIVE
RBC # BLD AUTO: 5.23 X10E6/UL (ref 4.14–5.8)
RBC #/AREA URNS HPF: NORMAL /HPF (ref 0–2)
SODIUM SERPL-SCNC: 140 MMOL/L (ref 134–144)
SP GR UR STRIP: 1.02 (ref 1–1.03)
TRIGL SERPL-MCNC: 231 MG/DL (ref 0–149)
UROBILINOGEN UR STRIP-MCNC: 0.2 MG/DL (ref 0.2–1)
VLDLC SERPL CALC-MCNC: 39 MG/DL (ref 5–40)
WBC # BLD AUTO: 8.7 X10E3/UL (ref 3.4–10.8)
WBC #/AREA URNS HPF: NORMAL /HPF (ref 0–5)

## 2025-08-11 ENCOUNTER — RESULTS FOLLOW-UP (OUTPATIENT)
Dept: INTERNAL MEDICINE | Facility: CLINIC | Age: 55
End: 2025-08-11
Payer: COMMERCIAL

## 2025-08-15 ENCOUNTER — OFFICE VISIT (OUTPATIENT)
Dept: INTERNAL MEDICINE | Facility: CLINIC | Age: 55
End: 2025-08-15
Payer: COMMERCIAL

## 2025-08-15 VITALS
BODY MASS INDEX: 35.93 KG/M2 | OXYGEN SATURATION: 97 % | WEIGHT: 251 LBS | SYSTOLIC BLOOD PRESSURE: 124 MMHG | DIASTOLIC BLOOD PRESSURE: 80 MMHG | HEART RATE: 76 BPM | TEMPERATURE: 97.6 F | HEIGHT: 70 IN

## 2025-08-15 DIAGNOSIS — E78.2 MIXED HYPERLIPIDEMIA: Primary | ICD-10-CM

## 2025-08-15 DIAGNOSIS — I10 ESSENTIAL HYPERTENSION: ICD-10-CM

## 2025-08-15 PROCEDURE — 99213 OFFICE O/P EST LOW 20 MIN: CPT | Performed by: NURSE PRACTITIONER

## 2025-08-15 RX ORDER — LISINOPRIL 10 MG/1
10 TABLET ORAL DAILY
Qty: 90 TABLET | Refills: 1 | Status: SHIPPED | OUTPATIENT
Start: 2025-08-15

## 2025-08-15 RX ORDER — ATORVASTATIN CALCIUM 10 MG/1
10 TABLET, FILM COATED ORAL DAILY
Qty: 90 TABLET | Refills: 1 | Status: SHIPPED | OUTPATIENT
Start: 2025-08-15

## 2025-08-20 ENCOUNTER — TELEPHONE (OUTPATIENT)
Dept: PSYCHIATRY | Facility: CLINIC | Age: 55
End: 2025-08-20
Payer: COMMERCIAL

## 2025-08-21 ENCOUNTER — TELEPHONE (OUTPATIENT)
Dept: PSYCHIATRY | Facility: CLINIC | Age: 55
End: 2025-08-21
Payer: COMMERCIAL